# Patient Record
Sex: MALE
[De-identification: names, ages, dates, MRNs, and addresses within clinical notes are randomized per-mention and may not be internally consistent; named-entity substitution may affect disease eponyms.]

---

## 2021-12-05 ENCOUNTER — NURSE TRIAGE (OUTPATIENT)
Dept: OTHER | Facility: CLINIC | Age: 61
End: 2021-12-05

## 2021-12-05 NOTE — TELEPHONE ENCOUNTER
Reason for Disposition   Question about upcoming scheduled test, no triage required and triager able to answer question    Answer Assessment - Initial Assessment Questions  1. REASON FOR CALL or QUESTION: \"What is your reason for calling today? \" or \"How can I best help you? \" or \"What question do you have that I can help answer? \"          Patient has lost pre prep paperwork for stress treadmill test.  Instructed to fast for 12 hours prior to test - no caffeine for 12 hours prior to test.  Inform your provider if you have taken Nitro within the last 24 hours. Bring inhalers to test with you if applicable. Call provider in the morning prior to test to inquire about whether to take heart medications prior to testing. Find paperwork if you can.     Protocols used: INFORMATION ONLY CALL - NO TRIAGE-ADULT-

## 2023-09-06 PROBLEM — F41.9 ANXIETY: Status: ACTIVE | Noted: 2023-09-06

## 2023-09-06 PROBLEM — R73.9 HYPERGLYCEMIA: Status: ACTIVE | Noted: 2023-09-06

## 2023-09-06 PROBLEM — G25.81 RESTLESS LEGS SYNDROME: Status: ACTIVE | Noted: 2023-09-06

## 2023-09-06 PROBLEM — I25.10 CORONARY ARTERIOSCLEROSIS: Status: ACTIVE | Noted: 2023-09-06

## 2023-09-06 PROBLEM — I50.22 CHRONIC SYSTOLIC HEART FAILURE (MULTI): Status: ACTIVE | Noted: 2023-09-06

## 2023-09-06 PROBLEM — I10 HYPERTENSION: Status: ACTIVE | Noted: 2023-09-06

## 2023-09-06 PROBLEM — K92.2 GASTROINTESTINAL HEMORRHAGE: Status: ACTIVE | Noted: 2023-09-06

## 2023-09-06 PROBLEM — E78.5 HYPERLIPIDEMIA: Status: ACTIVE | Noted: 2023-09-06

## 2023-09-06 RX ORDER — LISINOPRIL 10 MG/1
1 TABLET ORAL DAILY
COMMUNITY
End: 2024-01-12 | Stop reason: WASHOUT

## 2023-09-06 RX ORDER — NITROGLYCERIN 0.4 MG/1
TABLET SUBLINGUAL
COMMUNITY
End: 2024-01-12 | Stop reason: SDUPTHER

## 2023-09-06 RX ORDER — PANTOPRAZOLE SODIUM 40 MG/1
1 TABLET, DELAYED RELEASE ORAL DAILY
COMMUNITY
End: 2024-01-12 | Stop reason: WASHOUT

## 2023-09-06 RX ORDER — LISINOPRIL 20 MG/1
1 TABLET ORAL DAILY
COMMUNITY
End: 2024-01-12 | Stop reason: SDUPTHER

## 2023-09-06 RX ORDER — LORAZEPAM 1 MG/1
1 TABLET ORAL 2 TIMES DAILY PRN
COMMUNITY
Start: 2023-04-17 | End: 2023-11-21

## 2023-09-06 RX ORDER — CARVEDILOL 6.25 MG/1
1 TABLET ORAL
COMMUNITY
End: 2024-01-12 | Stop reason: SDUPTHER

## 2023-09-06 RX ORDER — CLOPIDOGREL BISULFATE 75 MG/1
1 TABLET ORAL DAILY
COMMUNITY
End: 2024-01-12 | Stop reason: SDUPTHER

## 2023-09-06 RX ORDER — FENOFIBRATE 145 MG/1
1 TABLET, FILM COATED ORAL
COMMUNITY
End: 2024-01-12 | Stop reason: SDUPTHER

## 2023-09-06 RX ORDER — ATORVASTATIN CALCIUM 40 MG/1
1 TABLET, FILM COATED ORAL DAILY
COMMUNITY
End: 2024-01-12 | Stop reason: WASHOUT

## 2023-09-06 RX ORDER — ASPIRIN 81 MG/1
1 TABLET ORAL DAILY
COMMUNITY
End: 2024-01-12 | Stop reason: WASHOUT

## 2023-11-21 ENCOUNTER — TELEPHONE (OUTPATIENT)
Dept: PRIMARY CARE | Facility: CLINIC | Age: 63
End: 2023-11-21
Payer: COMMERCIAL

## 2023-11-21 DIAGNOSIS — F41.9 ANXIETY: Primary | ICD-10-CM

## 2023-11-21 RX ORDER — LORAZEPAM 1 MG/1
1 TABLET ORAL DAILY PRN
Qty: 30 TABLET | Refills: 0 | Status: SHIPPED | OUTPATIENT
Start: 2023-11-21 | End: 2024-01-09

## 2023-12-16 ENCOUNTER — LAB (OUTPATIENT)
Dept: LAB | Facility: LAB | Age: 63
End: 2023-12-16
Payer: COMMERCIAL

## 2023-12-16 DIAGNOSIS — Z01.89 ENCOUNTER FOR OTHER SPECIFIED SPECIAL EXAMINATIONS: Primary | ICD-10-CM

## 2023-12-16 DIAGNOSIS — E78.2 MIXED HYPERLIPIDEMIA: ICD-10-CM

## 2023-12-16 LAB
ALBUMIN SERPL-MCNC: 4.4 G/DL (ref 3.5–5)
ALP BLD-CCNC: 29 U/L (ref 35–125)
ALT SERPL-CCNC: 22 U/L (ref 5–40)
ANION GAP SERPL CALC-SCNC: 14 MMOL/L
AST SERPL-CCNC: 27 U/L (ref 5–40)
BASOPHILS # BLD AUTO: 0.08 X10*3/UL (ref 0–0.1)
BASOPHILS NFR BLD AUTO: 0.7 %
BILIRUB DIRECT SERPL-MCNC: <0.2 MG/DL (ref 0–0.2)
BILIRUB SERPL-MCNC: 0.2 MG/DL (ref 0.1–1.2)
BUN SERPL-MCNC: 24 MG/DL (ref 8–25)
CALCIUM SERPL-MCNC: 9.6 MG/DL (ref 8.5–10.4)
CHLORIDE SERPL-SCNC: 101 MMOL/L (ref 97–107)
CHOLEST SERPL-MCNC: 153 MG/DL (ref 133–200)
CHOLEST/HDLC SERPL: 4 {RATIO}
CO2 SERPL-SCNC: 21 MMOL/L (ref 24–31)
CREAT SERPL-MCNC: 1.5 MG/DL (ref 0.4–1.6)
EOSINOPHIL # BLD AUTO: 0.29 X10*3/UL (ref 0–0.7)
EOSINOPHIL NFR BLD AUTO: 2.4 %
ERYTHROCYTE [DISTWIDTH] IN BLOOD BY AUTOMATED COUNT: 13.9 % (ref 11.5–14.5)
GFR SERPL CREATININE-BSD FRML MDRD: 51 ML/MIN/1.73M*2
GLUCOSE SERPL-MCNC: 98 MG/DL (ref 65–99)
HCT VFR BLD AUTO: 37.4 % (ref 41–52)
HDLC SERPL-MCNC: 38 MG/DL
HGB BLD-MCNC: 12.1 G/DL (ref 13.5–17.5)
IMM GRANULOCYTES # BLD AUTO: 0.11 X10*3/UL (ref 0–0.7)
IMM GRANULOCYTES NFR BLD AUTO: 0.9 % (ref 0–0.9)
LDLC SERPL CALC-MCNC: 82 MG/DL (ref 65–130)
LYMPHOCYTES # BLD AUTO: 2.87 X10*3/UL (ref 1.2–4.8)
LYMPHOCYTES NFR BLD AUTO: 23.9 %
MCH RBC QN AUTO: 30.6 PG (ref 26–34)
MCHC RBC AUTO-ENTMCNC: 32.4 G/DL (ref 32–36)
MCV RBC AUTO: 94 FL (ref 80–100)
MONOCYTES # BLD AUTO: 0.97 X10*3/UL (ref 0.1–1)
MONOCYTES NFR BLD AUTO: 8.1 %
NEUTROPHILS # BLD AUTO: 7.7 X10*3/UL (ref 1.2–7.7)
NEUTROPHILS NFR BLD AUTO: 64 %
NRBC BLD-RTO: 0 /100 WBCS (ref 0–0)
PLATELET # BLD AUTO: 305 X10*3/UL (ref 150–450)
POTASSIUM SERPL-SCNC: 4.8 MMOL/L (ref 3.4–5.1)
PROT SERPL-MCNC: 7.2 G/DL (ref 5.9–7.9)
RBC # BLD AUTO: 3.96 X10*6/UL (ref 4.5–5.9)
SODIUM SERPL-SCNC: 136 MMOL/L (ref 133–145)
TRIGL SERPL-MCNC: 165 MG/DL (ref 40–150)
WBC # BLD AUTO: 12 X10*3/UL (ref 4.4–11.3)

## 2023-12-16 PROCEDURE — 82248 BILIRUBIN DIRECT: CPT

## 2023-12-16 PROCEDURE — 85025 COMPLETE CBC W/AUTO DIFF WBC: CPT

## 2023-12-16 PROCEDURE — 80053 COMPREHEN METABOLIC PANEL: CPT

## 2023-12-16 PROCEDURE — 36415 COLL VENOUS BLD VENIPUNCTURE: CPT

## 2023-12-16 PROCEDURE — 80061 LIPID PANEL: CPT

## 2024-01-08 DIAGNOSIS — F41.9 ANXIETY: ICD-10-CM

## 2024-01-09 RX ORDER — LORAZEPAM 1 MG/1
1 TABLET ORAL DAILY PRN
Qty: 90 TABLET | Refills: 1 | Status: SHIPPED | OUTPATIENT
Start: 2024-01-09 | End: 2024-01-12 | Stop reason: SDUPTHER

## 2024-01-12 ENCOUNTER — OFFICE VISIT (OUTPATIENT)
Dept: PRIMARY CARE | Facility: CLINIC | Age: 64
End: 2024-01-12
Payer: COMMERCIAL

## 2024-01-12 VITALS
DIASTOLIC BLOOD PRESSURE: 80 MMHG | HEIGHT: 70 IN | SYSTOLIC BLOOD PRESSURE: 132 MMHG | BODY MASS INDEX: 30.64 KG/M2 | WEIGHT: 214 LBS | TEMPERATURE: 98 F | HEART RATE: 87 BPM | OXYGEN SATURATION: 96 %

## 2024-01-12 DIAGNOSIS — I25.10 CORONARY ARTERY DISEASE INVOLVING NATIVE CORONARY ARTERY OF NATIVE HEART WITHOUT ANGINA PECTORIS: ICD-10-CM

## 2024-01-12 DIAGNOSIS — Z23 ENCOUNTER FOR IMMUNIZATION: ICD-10-CM

## 2024-01-12 DIAGNOSIS — E55.9 VITAMIN D DEFICIENCY: ICD-10-CM

## 2024-01-12 DIAGNOSIS — Z12.5 ENCOUNTER FOR PROSTATE CANCER SCREENING: ICD-10-CM

## 2024-01-12 DIAGNOSIS — Z12.12 ENCOUNTER FOR COLORECTAL CANCER SCREENING: ICD-10-CM

## 2024-01-12 DIAGNOSIS — Z01.89 ENCOUNTER FOR ROUTINE LABORATORY TESTING: ICD-10-CM

## 2024-01-12 DIAGNOSIS — Z00.00 ANNUAL PHYSICAL EXAM: Primary | ICD-10-CM

## 2024-01-12 DIAGNOSIS — F41.9 ANXIETY: ICD-10-CM

## 2024-01-12 DIAGNOSIS — N18.31 STAGE 3A CHRONIC KIDNEY DISEASE (MULTI): ICD-10-CM

## 2024-01-12 DIAGNOSIS — Z12.11 ENCOUNTER FOR COLORECTAL CANCER SCREENING: ICD-10-CM

## 2024-01-12 DIAGNOSIS — E03.8 SUBCLINICAL HYPOTHYROIDISM: ICD-10-CM

## 2024-01-12 DIAGNOSIS — E11.69 TYPE 2 DIABETES MELLITUS WITH OTHER SPECIFIED COMPLICATION, WITHOUT LONG-TERM CURRENT USE OF INSULIN (MULTI): ICD-10-CM

## 2024-01-12 DIAGNOSIS — M15.9 PRIMARY OSTEOARTHRITIS INVOLVING MULTIPLE JOINTS: ICD-10-CM

## 2024-01-12 DIAGNOSIS — I10 ESSENTIAL HYPERTENSION: ICD-10-CM

## 2024-01-12 DIAGNOSIS — E78.2 MIXED HYPERLIPIDEMIA: ICD-10-CM

## 2024-01-12 PROBLEM — Z95.5 HISTORY OF CORONARY ARTERY STENT PLACEMENT: Status: ACTIVE | Noted: 2024-01-12

## 2024-01-12 PROBLEM — Z87.19 HISTORY OF GI BLEED: Status: ACTIVE | Noted: 2024-01-12

## 2024-01-12 PROBLEM — N18.9 CHRONIC KIDNEY DISEASE (CKD): Status: ACTIVE | Noted: 2024-01-12

## 2024-01-12 PROBLEM — R73.9 HYPERGLYCEMIA: Status: RESOLVED | Noted: 2023-09-06 | Resolved: 2024-01-12

## 2024-01-12 PROBLEM — M15.0 PRIMARY OSTEOARTHRITIS INVOLVING MULTIPLE JOINTS: Status: ACTIVE | Noted: 2024-01-12

## 2024-01-12 PROBLEM — E11.9 TYPE 2 DIABETES MELLITUS (MULTI): Status: ACTIVE | Noted: 2024-01-12

## 2024-01-12 PROBLEM — K92.2 GASTROINTESTINAL HEMORRHAGE: Status: RESOLVED | Noted: 2023-09-06 | Resolved: 2024-01-12

## 2024-01-12 LAB — POC HEMOGLOBIN A1C: 6.9 % (ref 4.2–6.5)

## 2024-01-12 PROCEDURE — 90677 PCV20 VACCINE IM: CPT | Performed by: STUDENT IN AN ORGANIZED HEALTH CARE EDUCATION/TRAINING PROGRAM

## 2024-01-12 PROCEDURE — 3075F SYST BP GE 130 - 139MM HG: CPT | Performed by: STUDENT IN AN ORGANIZED HEALTH CARE EDUCATION/TRAINING PROGRAM

## 2024-01-12 PROCEDURE — 1126F AMNT PAIN NOTED NONE PRSNT: CPT | Performed by: STUDENT IN AN ORGANIZED HEALTH CARE EDUCATION/TRAINING PROGRAM

## 2024-01-12 PROCEDURE — 1159F MED LIST DOCD IN RCRD: CPT | Performed by: STUDENT IN AN ORGANIZED HEALTH CARE EDUCATION/TRAINING PROGRAM

## 2024-01-12 PROCEDURE — 1036F TOBACCO NON-USER: CPT | Performed by: STUDENT IN AN ORGANIZED HEALTH CARE EDUCATION/TRAINING PROGRAM

## 2024-01-12 PROCEDURE — 99397 PER PM REEVAL EST PAT 65+ YR: CPT | Performed by: STUDENT IN AN ORGANIZED HEALTH CARE EDUCATION/TRAINING PROGRAM

## 2024-01-12 PROCEDURE — 4010F ACE/ARB THERAPY RXD/TAKEN: CPT | Performed by: STUDENT IN AN ORGANIZED HEALTH CARE EDUCATION/TRAINING PROGRAM

## 2024-01-12 PROCEDURE — 3079F DIAST BP 80-89 MM HG: CPT | Performed by: STUDENT IN AN ORGANIZED HEALTH CARE EDUCATION/TRAINING PROGRAM

## 2024-01-12 PROCEDURE — 83036 HEMOGLOBIN GLYCOSYLATED A1C: CPT | Mod: QW | Performed by: STUDENT IN AN ORGANIZED HEALTH CARE EDUCATION/TRAINING PROGRAM

## 2024-01-12 RX ORDER — ROSUVASTATIN CALCIUM 40 MG/1
40 TABLET, COATED ORAL DAILY
Start: 2024-01-12

## 2024-01-12 RX ORDER — ROSUVASTATIN CALCIUM 40 MG/1
40 TABLET, COATED ORAL EVERY 24 HOURS
COMMUNITY
Start: 2023-07-24 | End: 2024-01-12 | Stop reason: SDUPTHER

## 2024-01-12 RX ORDER — MULTIVITAMIN
1 TABLET ORAL DAILY
Start: 2024-01-12

## 2024-01-12 RX ORDER — LISINOPRIL 20 MG/1
20 TABLET ORAL DAILY
Start: 2024-01-12 | End: 2024-03-19 | Stop reason: SDUPTHER

## 2024-01-12 RX ORDER — CARVEDILOL 6.25 MG/1
6.25 TABLET ORAL
Start: 2024-01-12

## 2024-01-12 RX ORDER — METFORMIN HYDROCHLORIDE 500 MG/1
500 TABLET, EXTENDED RELEASE ORAL
Qty: 90 TABLET | Refills: 3 | Status: SHIPPED | OUTPATIENT
Start: 2024-01-12 | End: 2025-01-11

## 2024-01-12 RX ORDER — NITROGLYCERIN 0.4 MG/1
0.4 TABLET SUBLINGUAL EVERY 5 MIN PRN
Start: 2024-01-12

## 2024-01-12 RX ORDER — LORAZEPAM 1 MG/1
1 TABLET ORAL DAILY PRN
Qty: 90 TABLET | Refills: 1
Start: 2024-01-12

## 2024-01-12 RX ORDER — CLOPIDOGREL BISULFATE 75 MG/1
75 TABLET ORAL DAILY
Start: 2024-01-12

## 2024-01-12 RX ORDER — FENOFIBRATE 145 MG/1
145 TABLET, FILM COATED ORAL
Start: 2024-01-12

## 2024-01-12 RX ORDER — ACETAMINOPHEN 500 MG
2000 TABLET ORAL DAILY
Start: 2024-01-12

## 2024-01-12 ASSESSMENT — ENCOUNTER SYMPTOMS
NEUROLOGICAL NEGATIVE: 1
CARDIOVASCULAR NEGATIVE: 1
MUSCULOSKELETAL NEGATIVE: 1
RESPIRATORY NEGATIVE: 1
ALLERGIC/IMMUNOLOGIC NEGATIVE: 1
EYES NEGATIVE: 1
PSYCHIATRIC NEGATIVE: 1
ENDOCRINE NEGATIVE: 1
GASTROINTESTINAL NEGATIVE: 1
CONSTITUTIONAL NEGATIVE: 1
HEMATOLOGIC/LYMPHATIC NEGATIVE: 1

## 2024-01-12 ASSESSMENT — PAIN SCALES - GENERAL: PAINLEVEL: 0-NO PAIN

## 2024-01-12 NOTE — PROGRESS NOTES
[facility]: [facility]  PHYSICAL EXAM      Fredi Neal is a 64 y.o. male that is presenting today for Annual Exam.    Assessment/Plan   Diagnoses and all orders for this visit:  Annual physical exam  Encounter for routine laboratory testing  -     CBC and Auto Differential; Future  -     Basic Metabolic Panel; Future  -     Hepatic Function Panel; Future  -     Lipid Panel; Future  -     Hemoglobin A1C; Future  -     Vitamin D 25-Hydroxy,Total (for eval of Vitamin D levels); Future  -     TSH with reflex to Free T4 if abnormal; Future  -     Prostate Specific Antigen; Future  -     Albumin , Urine Random; Future  Encounter for prostate cancer screening  -     Prostate Specific Antigen; Future  Encounter for colorectal cancer screening  Encounter for immunization  Type 2 diabetes mellitus with other specified complication, without long-term current use of insulin (CMS/[facility])  -     POCT glycosylated hemoglobin (Hb A1C) manually resulted  -     Hemoglobin A1C; Future  -     Albumin , Urine Random; Future  Coronary artery disease involving native coronary artery of native heart without angina pectoris  Comments:  Stable. No changes at this time.  Orders:  -     carvedilol (Coreg) 6.25 mg tablet; Take 1 tablet (6.25 mg) by mouth 2 times a day with meals.  -     clopidogrel (Plavix) 75 mg tablet; Take 1 tablet (75 mg) by mouth once daily.  -     fenofibrate (Tricor) 145 mg tablet; Take 1 tablet (145 mg) by mouth once daily with breakfast.  -     lisinopril 20 mg tablet; Take 1 tablet (20 mg) by mouth once daily.  -     nitroglycerin (Nitrostat) 0.4 mg SL tablet; Place 1 tablet (0.4 mg) under the tongue every 5 minutes if needed for chest pain.  -     rosuvastatin (Crestor) 40 mg tablet; Take 1 tablet (40 mg) by mouth once daily.  Primary osteoarthritis involving multiple joints  Comments:  Stable. No changes at this time.  Stage 3a chronic kidney disease (CMS/HCC)  Comments:  Mild. Does not  require changes in treatment at this time.  Orders:  -     CBC and Auto Differential; Future  -     Basic Metabolic Panel; Future  Subclinical hypothyroidism  Comments:  New diagnosis. Does not require treatment. Check annually.  Orders:  -     TSH with reflex to Free T4 if abnormal; Future  Essential hypertension  Comments:  Stable. No changes at this time.  Orders:  -     carvedilol (Coreg) 6.25 mg tablet; Take 1 tablet (6.25 mg) by mouth 2 times a day with meals.  -     lisinopril 20 mg tablet; Take 1 tablet (20 mg) by mouth once daily.  Mixed hyperlipidemia  Comments:  Stable. No changes at this time.  Orders:  -     fenofibrate (Tricor) 145 mg tablet; Take 1 tablet (145 mg) by mouth once daily with breakfast.  -     rosuvastatin (Crestor) 40 mg tablet; Take 1 tablet (40 mg) by mouth once daily.  -     Lipid Panel; Future  Vitamin D deficiency  Comments:  Start supplementation.  Orders:  -     multivitamin tablet; Take 1 tablet by mouth once daily. One-A-Day Men's 50+ Complete Multivitamin  -     cholecalciferol (Vitamin D3) 50 mcg (2,000 unit) capsule; Take 1 capsule (50 mcg) by mouth once daily.  -     Vitamin D 25-Hydroxy,Total (for eval of Vitamin D levels); Future  Anxiety  Comments:  Stable. No changes at this time.  Orders:  -     LORazepam (Ativan) 1 mg tablet; Take 1 tablet (1 mg) by mouth once daily as needed for anxiety.    Subjective   - The patient otherwise feels well and denies any acute symptoms or concerns at this time.  - The patient denies any changes or progression of their chronic medical problems.  - The patient denies any problems or concerns with their medications.      Review of Systems   Constitutional: Negative.    HENT: Negative.     Eyes: Negative.    Respiratory: Negative.     Cardiovascular: Negative.    Gastrointestinal: Negative.    Endocrine: Negative.    Genitourinary: Negative.    Musculoskeletal: Negative.    Skin: Negative.    Allergic/Immunologic: Negative.    Neurological:  Negative.    Hematological: Negative.    Psychiatric/Behavioral: Negative.     All other systems reviewed and are negative.     Objective   Vitals:    01/12/24 1554   BP: 132/80   Pulse: 87   Temp: 36.7 °C (98 °F)   SpO2: 96%     Body mass index is 30.71 kg/m².  Physical Exam  Vitals and nursing note reviewed.   Constitutional:       General: He is not in acute distress.     Appearance: Normal appearance. He is not ill-appearing.   HENT:      Head: Normocephalic and atraumatic.      Right Ear: Tympanic membrane, ear canal and external ear normal. There is no impacted cerumen.      Left Ear: Tympanic membrane, ear canal and external ear normal. There is no impacted cerumen.      Nose: Nose normal.      Mouth/Throat:      Mouth: Mucous membranes are moist.      Pharynx: Oropharynx is clear. No oropharyngeal exudate or posterior oropharyngeal erythema.   Eyes:      General: No scleral icterus.        Right eye: No discharge.         Left eye: No discharge.      Extraocular Movements: Extraocular movements intact.      Conjunctiva/sclera: Conjunctivae normal.      Pupils: Pupils are equal, round, and reactive to light.   Neck:      Vascular: No carotid bruit.   Cardiovascular:      Rate and Rhythm: Normal rate and regular rhythm.      Pulses: Normal pulses.      Heart sounds: Normal heart sounds. No murmur heard.     No friction rub. No gallop.   Pulmonary:      Effort: Pulmonary effort is normal. No respiratory distress.      Breath sounds: Normal breath sounds.   Abdominal:      General: Abdomen is flat. Bowel sounds are normal.      Palpations: Abdomen is soft.      Tenderness: There is no abdominal tenderness.      Hernia: No hernia is present.   Musculoskeletal:         General: No swelling. Normal range of motion.      Cervical back: Normal range of motion.   Lymphadenopathy:      Cervical: No cervical adenopathy.   Skin:     General: Skin is warm and dry.      Coloration: Skin is not jaundiced.      Findings: No  "rash.   Neurological:      General: No focal deficit present.      Mental Status: He is alert and oriented to person, place, and time. Mental status is at baseline.   Psychiatric:         Mood and Affect: Mood normal.         Behavior: Behavior normal.       Diagnostic Results   Lab Results   Component Value Date    GLUCOSE 98 12/16/2023    CALCIUM 9.6 12/16/2023     12/16/2023    K 4.8 12/16/2023    CO2 21 (L) 12/16/2023     12/16/2023    BUN 24 12/16/2023    CREATININE 1.50 12/16/2023     Lab Results   Component Value Date    ALT 22 12/16/2023    AST 27 12/16/2023    ALKPHOS 29 (L) 12/16/2023    BILITOT 0.2 12/16/2023     Lab Results   Component Value Date    WBC 12.0 (H) 12/16/2023    HGB 12.1 (L) 12/16/2023    HCT 37.4 (L) 12/16/2023    MCV 94 12/16/2023     12/16/2023     Lab Results   Component Value Date    CHOL 153 12/16/2023    CHOL 156 07/22/2023    CHOL 168 06/30/2023     Lab Results   Component Value Date    HDL 38.0 (L) 12/16/2023    HDL 36 (L) 07/22/2023    HDL 38 (L) 06/30/2023     Lab Results   Component Value Date    LDLCALC 82 12/16/2023    LDLCALC 96 07/22/2023    LDLCALC 100 06/30/2023     Lab Results   Component Value Date    TRIG 165 (H) 12/16/2023    TRIG 120 07/22/2023    TRIG 149 06/30/2023     No components found for: \"CHOLHDL\"  Lab Results   Component Value Date    HGBA1C 6.9 (A) 01/12/2024     Other labs not included in the list above were reviewed either before or during this encounter.    History   No past medical history on file.  No past surgical history on file.  Family History   Problem Relation Name Age of Onset    Cancer Mother      Heart attack Father      Heart disease Father      Diabetes Sister      Sleep apnea Brother      No Known Problems Brother      No Known Problems Daughter      No Known Problems Son       Social History     Socioeconomic History    Marital status:      Spouse name: Not on file    Number of children: Not on file    Years of " education: Not on file    Highest education level: Not on file   Occupational History    Not on file   Tobacco Use    Smoking status: Never    Smokeless tobacco: Never   Substance and Sexual Activity    Alcohol use: Yes     Comment: socially    Drug use: Yes     Types: Marijuana    Sexual activity: Not on file   Other Topics Concern    Not on file   Social History Narrative    Not on file     Social Determinants of Health     Financial Resource Strain: Not on file   Food Insecurity: Not on file   Transportation Needs: Not on file   Physical Activity: Not on file   Stress: Not on file   Social Connections: Not on file   Intimate Partner Violence: Not on file   Housing Stability: Not on file     No Known Allergies  Current Outpatient Medications on File Prior to Visit   Medication Sig Dispense Refill    [DISCONTINUED] carvedilol (Coreg) 6.25 mg tablet Take 1 tablet (6.25 mg) by mouth 2 times a day with meals.      [DISCONTINUED] clopidogrel (Plavix) 75 mg tablet Take 1 tablet (75 mg) by mouth once daily.      [DISCONTINUED] fenofibrate (Tricor) 145 mg tablet Take 1 tablet (145 mg) by mouth once daily with breakfast. For 90 days      [DISCONTINUED] lisinopril 20 mg tablet Take 1 tablet (20 mg) by mouth once daily.      [DISCONTINUED] LORazepam (Ativan) 1 mg tablet TAKE ONE TABLET BY MOUTH EVERY DAY AS NEEDED 90 tablet 1    [DISCONTINUED] nitroglycerin (Nitrostat) 0.4 mg SL tablet 1 tablet under the tongue and allow to dissolve as needed Sublingual as directed      [DISCONTINUED] rosuvastatin (Crestor) 40 mg tablet Take 1 tablet (40 mg) by mouth once every 24 hours.      [DISCONTINUED] aspirin 81 mg EC tablet Take 1 tablet (81 mg) by mouth once daily.      [DISCONTINUED] atorvastatin (Lipitor) 40 mg tablet Take 1 tablet (40 mg) by mouth once daily.      [DISCONTINUED] lisinopril 10 mg tablet Take 1 tablet (10 mg) by mouth once daily.      [DISCONTINUED] LORazepam (Ativan) 1 mg tablet TAKE ONE TABLET BY MOUTH DAILY AS  NEEDED 30 tablet 0    [DISCONTINUED] pantoprazole (Protonix) 40 mg EC tablet Take 1 tablet (40 mg) by mouth once daily.       No current facility-administered medications on file prior to visit.     Immunization History   Administered Date(s) Administered    Flu vaccine, quadrivalent, recombinant, preservative free, adult (FLUBLOK) 12/02/2021    Influenza, injectable, quadrivalent 10/01/2020    Pfizer Purple Cap SARS-CoV-2 04/10/2021, 05/01/2021, 12/24/2021    Tdap vaccine, age 7 year and older (BOOSTRIX) 08/23/2017     Patient's medical history was reviewed and updated either before or during this encounter.       Cali Chakraborty MD

## 2024-01-12 NOTE — PROGRESS NOTES
[facility]: [facility]  PHYSICAL EXAM      Fredi Neal is a 64 y.o. male that is presenting today for Annual Exam.    Assessment/Plan   Diagnoses and all orders for this visit:  Annual physical exam  -     Follow Up In Primary Care; Future  Encounter for routine laboratory testing  Comments:  Discussed labwork at length.  Will order labs for the patient's next appointment.  Orders:  -     CBC and Auto Differential; Future  -     Basic Metabolic Panel; Future  -     Hepatic Function Panel; Future  -     Lipid Panel; Future  -     Hemoglobin A1C; Future  -     Vitamin D 25-Hydroxy,Total (for eval of Vitamin D levels); Future  -     TSH with reflex to Free T4 if abnormal; Future  -     Prostate Specific Antigen; Future  -     Albumin , Urine Random; Future  Encounter for prostate cancer screening  -     Prostate Specific Antigen; Future  Encounter for colorectal cancer screening  Comments:  Not due until 2025.  Encounter for immunization  Comments:  Pneumonia (prevnar-20) today.  Patient is otherwise not due for routine immunizations at this time.  Orders:  -     Pneumococcal conjugate vaccine, 20-valent (PREVNAR 20)  Type 2 diabetes mellitus with other specified complication, without long-term current use of insulin (CMS/[facility])  Comments:  Mild worsening. Relatively new diagnosis.  Trial metformin. Counseled patient on potential side effects.  Orders:  -     POCT glycosylated hemoglobin (Hb A1C) manually resulted  -     Hemoglobin A1C; Future  -     Albumin , Urine Random; Future  -     metFORMIN XR (Glucophage-XR) 500 mg 24 hr tablet; Take 1 tablet (500 mg) by mouth once daily in the evening. Take with meals. Do not crush, chew, or split.  Coronary artery disease involving native coronary artery of native heart without angina pectoris  Comments:  Stable. No changes at this time.  Orders:  -     carvedilol (Coreg) 6.25 mg tablet; Take 1 tablet (6.25 mg) by mouth 2 times a day with meals.  -      clopidogrel (Plavix) 75 mg tablet; Take 1 tablet (75 mg) by mouth once daily.  -     fenofibrate (Tricor) 145 mg tablet; Take 1 tablet (145 mg) by mouth once daily with breakfast.  -     lisinopril 20 mg tablet; Take 1 tablet (20 mg) by mouth once daily.  -     nitroglycerin (Nitrostat) 0.4 mg SL tablet; Place 1 tablet (0.4 mg) under the tongue every 5 minutes if needed for chest pain.  -     rosuvastatin (Crestor) 40 mg tablet; Take 1 tablet (40 mg) by mouth once daily.  Primary osteoarthritis involving multiple joints  Comments:  Stable. No changes at this time.  Stage 3a chronic kidney disease (CMS/HCC)  Comments:  Mild. Does not require changes in treatment at this time.  Orders:  -     CBC and Auto Differential; Future  -     Basic Metabolic Panel; Future  Subclinical hypothyroidism  Comments:  New diagnosis. Does not require treatment. Check annually.  Orders:  -     TSH with reflex to Free T4 if abnormal; Future  Essential hypertension  Comments:  Stable. No changes at this time.  Orders:  -     carvedilol (Coreg) 6.25 mg tablet; Take 1 tablet (6.25 mg) by mouth 2 times a day with meals.  -     lisinopril 20 mg tablet; Take 1 tablet (20 mg) by mouth once daily.  Mixed hyperlipidemia  Comments:  Stable. No changes at this time.  Orders:  -     fenofibrate (Tricor) 145 mg tablet; Take 1 tablet (145 mg) by mouth once daily with breakfast.  -     rosuvastatin (Crestor) 40 mg tablet; Take 1 tablet (40 mg) by mouth once daily.  -     Lipid Panel; Future  Vitamin D deficiency  Comments:  Start supplementation.  Orders:  -     multivitamin tablet; Take 1 tablet by mouth once daily. One-A-Day Men's 50+ Complete Multivitamin  -     cholecalciferol (Vitamin D3) 50 mcg (2,000 unit) capsule; Take 1 capsule (50 mcg) by mouth once daily.  -     Vitamin D 25-Hydroxy,Total (for eval of Vitamin D levels); Future  Anxiety  Comments:  Stable. No changes at this time.  Orders:  -     LORazepam (Ativan) 1 mg tablet; Take 1  tablet (1 mg) by mouth once daily as needed for anxiety.    Subjective   - The patient otherwise feels well and denies any acute symptoms or concerns at this time.  - The patient denies any changes or progression of their chronic medical problems.  - The patient denies any problems or concerns with their medications.      Review of Systems   Constitutional: Negative.    HENT: Negative.     Eyes: Negative.    Respiratory: Negative.     Cardiovascular: Negative.    Gastrointestinal: Negative.    Endocrine: Negative.    Genitourinary: Negative.    Musculoskeletal: Negative.    Skin: Negative.    Allergic/Immunologic: Negative.    Neurological: Negative.    Hematological: Negative.    Psychiatric/Behavioral: Negative.     All other systems reviewed and are negative.     Objective   Vitals:    01/12/24 1554   BP: 132/80   Pulse: 87   Temp: 36.7 °C (98 °F)   SpO2: 96%     Body mass index is 30.71 kg/m².  Physical Exam  Vitals and nursing note reviewed.   Constitutional:       General: He is not in acute distress.     Appearance: Normal appearance. He is not ill-appearing.   HENT:      Head: Normocephalic and atraumatic.      Right Ear: Tympanic membrane, ear canal and external ear normal. There is no impacted cerumen.      Left Ear: Tympanic membrane, ear canal and external ear normal. There is no impacted cerumen.      Nose: Nose normal.      Mouth/Throat:      Mouth: Mucous membranes are moist.      Pharynx: Oropharynx is clear. No oropharyngeal exudate or posterior oropharyngeal erythema.   Eyes:      General: No scleral icterus.        Right eye: No discharge.         Left eye: No discharge.      Extraocular Movements: Extraocular movements intact.      Conjunctiva/sclera: Conjunctivae normal.      Pupils: Pupils are equal, round, and reactive to light.   Neck:      Vascular: No carotid bruit.   Cardiovascular:      Rate and Rhythm: Normal rate and regular rhythm.      Pulses: Normal pulses.      Heart sounds: Normal  "heart sounds. No murmur heard.     No friction rub. No gallop.   Pulmonary:      Effort: Pulmonary effort is normal. No respiratory distress.      Breath sounds: Normal breath sounds.   Abdominal:      General: Abdomen is flat. Bowel sounds are normal.      Palpations: Abdomen is soft.      Tenderness: There is no abdominal tenderness.      Hernia: No hernia is present.   Musculoskeletal:         General: No swelling. Normal range of motion.      Cervical back: Normal range of motion.   Lymphadenopathy:      Cervical: No cervical adenopathy.   Skin:     General: Skin is warm and dry.      Coloration: Skin is not jaundiced.      Findings: No rash.   Neurological:      General: No focal deficit present.      Mental Status: He is alert and oriented to person, place, and time. Mental status is at baseline.   Psychiatric:         Mood and Affect: Mood normal.         Behavior: Behavior normal.       Diagnostic Results   Lab Results   Component Value Date    GLUCOSE 98 12/16/2023    CALCIUM 9.6 12/16/2023     12/16/2023    K 4.8 12/16/2023    CO2 21 (L) 12/16/2023     12/16/2023    BUN 24 12/16/2023    CREATININE 1.50 12/16/2023     Lab Results   Component Value Date    ALT 22 12/16/2023    AST 27 12/16/2023    ALKPHOS 29 (L) 12/16/2023    BILITOT 0.2 12/16/2023     Lab Results   Component Value Date    WBC 12.0 (H) 12/16/2023    HGB 12.1 (L) 12/16/2023    HCT 37.4 (L) 12/16/2023    MCV 94 12/16/2023     12/16/2023     Lab Results   Component Value Date    CHOL 153 12/16/2023    CHOL 156 07/22/2023    CHOL 168 06/30/2023     Lab Results   Component Value Date    HDL 38.0 (L) 12/16/2023    HDL 36 (L) 07/22/2023    HDL 38 (L) 06/30/2023     Lab Results   Component Value Date    LDLCALC 82 12/16/2023    LDLCALC 96 07/22/2023    LDLCALC 100 06/30/2023     Lab Results   Component Value Date    TRIG 165 (H) 12/16/2023    TRIG 120 07/22/2023    TRIG 149 06/30/2023     No components found for: \"CHOLHDL\"  Lab " Results   Component Value Date    HGBA1C 6.9 (A) 01/12/2024     Other labs not included in the list above were reviewed either before or during this encounter.    History   No past medical history on file.  No past surgical history on file.  Family History   Problem Relation Name Age of Onset    Cancer Mother      Heart attack Father      Heart disease Father      Diabetes Sister      Sleep apnea Brother      No Known Problems Brother      No Known Problems Daughter      No Known Problems Son       Social History     Socioeconomic History    Marital status:      Spouse name: Not on file    Number of children: Not on file    Years of education: Not on file    Highest education level: Not on file   Occupational History    Not on file   Tobacco Use    Smoking status: Never    Smokeless tobacco: Never   Substance and Sexual Activity    Alcohol use: Yes     Comment: socially    Drug use: Yes     Types: Marijuana    Sexual activity: Not on file   Other Topics Concern    Not on file   Social History Narrative    Not on file     Social Determinants of Health     Financial Resource Strain: Not on file   Food Insecurity: Not on file   Transportation Needs: Not on file   Physical Activity: Not on file   Stress: Not on file   Social Connections: Not on file   Intimate Partner Violence: Not on file   Housing Stability: Not on file     No Known Allergies  Current Outpatient Medications on File Prior to Visit   Medication Sig Dispense Refill    [DISCONTINUED] carvedilol (Coreg) 6.25 mg tablet Take 1 tablet (6.25 mg) by mouth 2 times a day with meals.      [DISCONTINUED] clopidogrel (Plavix) 75 mg tablet Take 1 tablet (75 mg) by mouth once daily.      [DISCONTINUED] fenofibrate (Tricor) 145 mg tablet Take 1 tablet (145 mg) by mouth once daily with breakfast. For 90 days      [DISCONTINUED] lisinopril 20 mg tablet Take 1 tablet (20 mg) by mouth once daily.      [DISCONTINUED] LORazepam (Ativan) 1 mg tablet TAKE ONE TABLET BY  MOUTH EVERY DAY AS NEEDED 90 tablet 1    [DISCONTINUED] nitroglycerin (Nitrostat) 0.4 mg SL tablet 1 tablet under the tongue and allow to dissolve as needed Sublingual as directed      [DISCONTINUED] rosuvastatin (Crestor) 40 mg tablet Take 1 tablet (40 mg) by mouth once every 24 hours.      [DISCONTINUED] aspirin 81 mg EC tablet Take 1 tablet (81 mg) by mouth once daily.      [DISCONTINUED] atorvastatin (Lipitor) 40 mg tablet Take 1 tablet (40 mg) by mouth once daily.      [DISCONTINUED] lisinopril 10 mg tablet Take 1 tablet (10 mg) by mouth once daily.      [DISCONTINUED] LORazepam (Ativan) 1 mg tablet TAKE ONE TABLET BY MOUTH DAILY AS NEEDED 30 tablet 0    [DISCONTINUED] pantoprazole (Protonix) 40 mg EC tablet Take 1 tablet (40 mg) by mouth once daily.       No current facility-administered medications on file prior to visit.     Immunization History   Administered Date(s) Administered    Flu vaccine (IIV4), preservative free *Check age/dose* 10/25/2023    Flu vaccine, quadrivalent, recombinant, preservative free, adult (FLUBLOK) 12/02/2021    Influenza, injectable, quadrivalent 10/01/2020    Pfizer Purple Cap SARS-CoV-2 04/10/2021, 05/01/2021, 12/24/2021    Tdap vaccine, age 7 year and older (BOOSTRIX) 08/23/2017    Zoster vaccine, recombinant, adult (SHINGRIX) 01/04/2023, 05/20/2023     Patient's medical history was reviewed and updated either before or during this encounter.       Cali Chakraborty MD

## 2024-01-12 NOTE — PATIENT INSTRUCTIONS
Diagnoses and all orders for this visit:  Annual physical exam  -     Follow Up In Primary Care; Future  Encounter for routine laboratory testing  Comments:  Discussed labwork at length.  Will order labs for the patient's next appointment.  Orders:  -     CBC and Auto Differential; Future  -     Basic Metabolic Panel; Future  -     Hepatic Function Panel; Future  -     Lipid Panel; Future  -     Hemoglobin A1C; Future  -     Vitamin D 25-Hydroxy,Total (for eval of Vitamin D levels); Future  -     TSH with reflex to Free T4 if abnormal; Future  -     Prostate Specific Antigen; Future  -     Albumin , Urine Random; Future  Encounter for prostate cancer screening  -     Prostate Specific Antigen; Future  Encounter for colorectal cancer screening  Comments:  Not due until 2025.  Encounter for immunization  Comments:  Pneumonia (prevnar-20) today.  Patient is otherwise not due for routine immunizations at this time.  Orders:  -     Pneumococcal conjugate vaccine, 20-valent (PREVNAR 20)  Type 2 diabetes mellitus with other specified complication, without long-term current use of insulin (CMS/Formerly [facility])  Comments:  Mild worsening. Relatively new diagnosis.  Trial metformin. Counseled patient on potential side effects.  Orders:  -     POCT glycosylated hemoglobin (Hb A1C) manually resulted  -     Hemoglobin A1C; Future  -     Albumin , Urine Random; Future  -     metFORMIN XR (Glucophage-XR) 500 mg 24 hr tablet; Take 1 tablet (500 mg) by mouth once daily in the evening. Take with meals. Do not crush, chew, or split.  Coronary artery disease involving native coronary artery of native heart without angina pectoris  Comments:  Stable. No changes at this time.  Orders:  -     carvedilol (Coreg) 6.25 mg tablet; Take 1 tablet (6.25 mg) by mouth 2 times a day with meals.  -     clopidogrel (Plavix) 75 mg tablet; Take 1 tablet (75 mg) by mouth once daily.  -     fenofibrate (Tricor) 145 mg tablet; Take 1 tablet (145 mg) by mouth once  daily with breakfast.  -     lisinopril 20 mg tablet; Take 1 tablet (20 mg) by mouth once daily.  -     nitroglycerin (Nitrostat) 0.4 mg SL tablet; Place 1 tablet (0.4 mg) under the tongue every 5 minutes if needed for chest pain.  -     rosuvastatin (Crestor) 40 mg tablet; Take 1 tablet (40 mg) by mouth once daily.  Primary osteoarthritis involving multiple joints  Comments:  Stable. No changes at this time.  Stage 3a chronic kidney disease (CMS/HCC)  Comments:  Mild. Does not require changes in treatment at this time.  Orders:  -     CBC and Auto Differential; Future  -     Basic Metabolic Panel; Future  Subclinical hypothyroidism  Comments:  New diagnosis. Does not require treatment. Check annually.  Orders:  -     TSH with reflex to Free T4 if abnormal; Future  Essential hypertension  Comments:  Stable. No changes at this time.  Orders:  -     carvedilol (Coreg) 6.25 mg tablet; Take 1 tablet (6.25 mg) by mouth 2 times a day with meals.  -     lisinopril 20 mg tablet; Take 1 tablet (20 mg) by mouth once daily.  Mixed hyperlipidemia  Comments:  Stable. No changes at this time.  Orders:  -     fenofibrate (Tricor) 145 mg tablet; Take 1 tablet (145 mg) by mouth once daily with breakfast.  -     rosuvastatin (Crestor) 40 mg tablet; Take 1 tablet (40 mg) by mouth once daily.  -     Lipid Panel; Future  Vitamin D deficiency  Comments:  Start supplementation.  Orders:  -     multivitamin tablet; Take 1 tablet by mouth once daily. One-A-Day Men's 50+ Complete Multivitamin  -     cholecalciferol (Vitamin D3) 50 mcg (2,000 unit) capsule; Take 1 capsule (50 mcg) by mouth once daily.  -     Vitamin D 25-Hydroxy,Total (for eval of Vitamin D levels); Future  Anxiety  Comments:  Stable. No changes at this time.  Orders:  -     LORazepam (Ativan) 1 mg tablet; Take 1 tablet (1 mg) by mouth once daily as needed for anxiety.

## 2024-01-24 ENCOUNTER — TELEPHONE (OUTPATIENT)
Dept: PRIMARY CARE | Facility: CLINIC | Age: 64
End: 2024-01-24
Payer: COMMERCIAL
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
## 2024-01-25 ENCOUNTER — APPOINTMENT (OUTPATIENT)
Dept: PRIMARY CARE | Facility: CLINIC | Age: 64
End: 2024-01-25
Payer: COMMERCIAL

## 2024-01-30 ENCOUNTER — OFFICE VISIT (OUTPATIENT)
Dept: CARDIOLOGY | Facility: CLINIC | Age: 64
End: 2024-01-30
Payer: COMMERCIAL

## 2024-01-30 VITALS
WEIGHT: 217 LBS | DIASTOLIC BLOOD PRESSURE: 64 MMHG | SYSTOLIC BLOOD PRESSURE: 114 MMHG | BODY MASS INDEX: 31.14 KG/M2 | HEART RATE: 77 BPM | OXYGEN SATURATION: 96 %

## 2024-01-30 DIAGNOSIS — I25.10 CORONARY ARTERY DISEASE INVOLVING NATIVE CORONARY ARTERY OF NATIVE HEART, UNSPECIFIED WHETHER ANGINA PRESENT: Primary | ICD-10-CM

## 2024-01-30 DIAGNOSIS — I10 ESSENTIAL HYPERTENSION: ICD-10-CM

## 2024-01-30 DIAGNOSIS — E78.2 MIXED HYPERLIPIDEMIA: ICD-10-CM

## 2024-01-30 PROCEDURE — 1160F RVW MEDS BY RX/DR IN RCRD: CPT | Performed by: INTERNAL MEDICINE

## 2024-01-30 PROCEDURE — 99213 OFFICE O/P EST LOW 20 MIN: CPT | Performed by: INTERNAL MEDICINE

## 2024-01-30 PROCEDURE — 3078F DIAST BP <80 MM HG: CPT | Performed by: INTERNAL MEDICINE

## 2024-01-30 PROCEDURE — 1036F TOBACCO NON-USER: CPT | Performed by: INTERNAL MEDICINE

## 2024-01-30 PROCEDURE — 1126F AMNT PAIN NOTED NONE PRSNT: CPT | Performed by: INTERNAL MEDICINE

## 2024-01-30 PROCEDURE — 1159F MED LIST DOCD IN RCRD: CPT | Performed by: INTERNAL MEDICINE

## 2024-01-30 PROCEDURE — 3074F SYST BP LT 130 MM HG: CPT | Performed by: INTERNAL MEDICINE

## 2024-01-30 PROCEDURE — 4010F ACE/ARB THERAPY RXD/TAKEN: CPT | Performed by: INTERNAL MEDICINE

## 2024-01-30 ASSESSMENT — PAIN SCALES - GENERAL: PAINLEVEL: 0-NO PAIN

## 2024-01-30 ASSESSMENT — ENCOUNTER SYMPTOMS
DEPRESSION: 0
SHORTNESS OF BREATH: 0
HEMATURIA: 0
PARESTHESIAS: 0
DYSURIA: 0
ABDOMINAL PAIN: 0
COUGH: 0
BLURRED VISION: 0
DYSPNEA ON EXERTION: 0
NUMBNESS: 0
PALPITATIONS: 0
OCCASIONAL FEELINGS OF UNSTEADINESS: 0
LOSS OF SENSATION IN FEET: 0

## 2024-01-30 ASSESSMENT — PATIENT HEALTH QUESTIONNAIRE - PHQ9
1. LITTLE INTEREST OR PLEASURE IN DOING THINGS: NOT AT ALL
2. FEELING DOWN, DEPRESSED OR HOPELESS: NOT AT ALL
SUM OF ALL RESPONSES TO PHQ9 QUESTIONS 1 AND 2: 0

## 2024-01-30 NOTE — ASSESSMENT & PLAN NOTE
Doing very well from a cardiac standpoint with no chest pain or anginal symptoms.  Will continue standard risk factor modification and follow on a regular clinical basis.

## 2024-01-30 NOTE — ASSESSMENT & PLAN NOTE
Reviewed lipid panel:  Tchol 153  HDL 38 LDL 82.  LDL had been on 100 on atorvastatin so down almost 20 points with change to rosuvastatin.  Will stress dietary measures and recheck panel on return.

## 2024-01-30 NOTE — PROGRESS NOTES
Subjective   Fredi Neal is a 64 y.o. male.    Chief Complaint:  6 MONTH FOLLOW UP (Essential hypertension)    HPI  Patient doing well overall.  No chest pain or anginal type symptoms.  No unusual shortness of breath.  Staying relatively active although would like to be more active in the future.    Review of Systems   Constitutional: Negative for malaise/fatigue.   HENT:  Negative for congestion.    Eyes:  Negative for blurred vision.   Cardiovascular:  Negative for chest pain, dyspnea on exertion and palpitations.   Respiratory:  Negative for cough and shortness of breath.    Musculoskeletal:  Negative for joint pain.   Gastrointestinal:  Negative for abdominal pain.   Genitourinary:  Negative for dysuria and hematuria.   Neurological:  Negative for numbness and paresthesias.       Objective   Constitutional:       Appearance: Not in distress.   Eyes:      Conjunctiva/sclera: Conjunctivae normal.   Neck:      Vascular: JVD normal.   Pulmonary:      Breath sounds: Normal breath sounds. No wheezing. No rhonchi. No rales.   Cardiovascular:      Normal rate. Regular rhythm.      Murmurs: There is no murmur.      No gallop.  No click. No rub.   Abdominal:      Palpations: Abdomen is soft.   Neurological:      General: No focal deficit present.      Mental Status: Alert.         Lab Review:   Office Visit on 01/12/2024   Component Date Value    POC HEMOGLOBIN A1c 01/12/2024 6.9 (A)    Lab on 12/16/2023   Component Date Value    Glucose 12/16/2023 98     Sodium 12/16/2023 136     Potassium 12/16/2023 4.8     Chloride 12/16/2023 101     Bicarbonate 12/16/2023 21 (L)     Urea Nitrogen 12/16/2023 24     Creatinine 12/16/2023 1.50     eGFR 12/16/2023 51 (L)     Calcium 12/16/2023 9.6     Anion Gap 12/16/2023 14     AST 12/16/2023 27     ALT 12/16/2023 22     Alkaline Phosphatase 12/16/2023 29 (L)     Bilirubin, Total 12/16/2023 0.2     Bilirubin, Direct 12/16/2023 <0.2     Total Protein 12/16/2023 7.2     Albumin  12/16/2023 4.4     WBC 12/16/2023 12.0 (H)     nRBC 12/16/2023 0.0     RBC 12/16/2023 3.96 (L)     Hemoglobin 12/16/2023 12.1 (L)     Hematocrit 12/16/2023 37.4 (L)     MCV 12/16/2023 94     MCH 12/16/2023 30.6     MCHC 12/16/2023 32.4     RDW 12/16/2023 13.9     Platelets 12/16/2023 305     Neutrophils % 12/16/2023 64.0     Immature Granulocytes %,* 12/16/2023 0.9     Lymphocytes % 12/16/2023 23.9     Monocytes % 12/16/2023 8.1     Eosinophils % 12/16/2023 2.4     Basophils % 12/16/2023 0.7     Neutrophils Absolute 12/16/2023 7.70     Immature Granulocytes Ab* 12/16/2023 0.11     Lymphocytes Absolute 12/16/2023 2.87     Monocytes Absolute 12/16/2023 0.97     Eosinophils Absolute 12/16/2023 0.29     Basophils Absolute 12/16/2023 0.08     Cholesterol 12/16/2023 153     HDL-Cholesterol 12/16/2023 38.0 (L)     Cholesterol/HDL Ratio 12/16/2023 4.0     LDL Calculated 12/16/2023 82     Triglycerides 12/16/2023 165 (H)        Assessment/Plan   The primary encounter diagnosis was Coronary artery disease involving native coronary artery of native heart, unspecified whether angina present. Diagnoses of Essential hypertension and Mixed hyperlipidemia were also pertinent to this visit.    Hyperlipidemia  Reviewed lipid panel:  Tchol 153  HDL 38 LDL 82.  LDL had been on 100 on atorvastatin so down almost 20 points with change to rosuvastatin.  Will stress dietary measures and recheck panel on return.    Coronary artery disease  Doing very well from a cardiac standpoint with no chest pain or anginal symptoms.  Will continue standard risk factor modification and follow on a regular clinical basis.    Essential hypertension  Blood pressure is very well-controlled, no changes need to be made.

## 2024-03-19 DIAGNOSIS — I10 ESSENTIAL HYPERTENSION: ICD-10-CM

## 2024-03-19 DIAGNOSIS — I25.10 CORONARY ARTERY DISEASE INVOLVING NATIVE CORONARY ARTERY OF NATIVE HEART WITHOUT ANGINA PECTORIS: ICD-10-CM

## 2024-03-19 RX ORDER — LISINOPRIL 20 MG/1
20 TABLET ORAL DAILY
Qty: 30 TABLET | Refills: 3 | Status: SHIPPED | OUTPATIENT
Start: 2024-03-19 | End: 2024-07-17

## 2024-07-11 ENCOUNTER — APPOINTMENT (OUTPATIENT)
Dept: PRIMARY CARE | Facility: CLINIC | Age: 64
End: 2024-07-11
Payer: COMMERCIAL

## 2024-07-17 DIAGNOSIS — F41.9 ANXIETY: ICD-10-CM

## 2024-07-18 RX ORDER — LORAZEPAM 1 MG/1
1 TABLET ORAL DAILY PRN
Qty: 15 TABLET | Refills: 0 | Status: SHIPPED | OUTPATIENT
Start: 2024-07-18

## 2024-07-22 ENCOUNTER — OFFICE VISIT (OUTPATIENT)
Dept: CARDIOLOGY | Facility: CLINIC | Age: 64
End: 2024-07-22
Payer: COMMERCIAL

## 2024-07-22 VITALS
BODY MASS INDEX: 31.85 KG/M2 | OXYGEN SATURATION: 97 % | WEIGHT: 222 LBS | DIASTOLIC BLOOD PRESSURE: 70 MMHG | HEART RATE: 82 BPM | SYSTOLIC BLOOD PRESSURE: 132 MMHG

## 2024-07-22 DIAGNOSIS — I10 ESSENTIAL HYPERTENSION: ICD-10-CM

## 2024-07-22 DIAGNOSIS — E78.2 MIXED HYPERLIPIDEMIA: ICD-10-CM

## 2024-07-22 DIAGNOSIS — I25.10 CORONARY ARTERY DISEASE INVOLVING NATIVE CORONARY ARTERY OF NATIVE HEART, UNSPECIFIED WHETHER ANGINA PRESENT: Primary | ICD-10-CM

## 2024-07-22 PROCEDURE — 3075F SYST BP GE 130 - 139MM HG: CPT | Performed by: INTERNAL MEDICINE

## 2024-07-22 PROCEDURE — 99213 OFFICE O/P EST LOW 20 MIN: CPT | Performed by: INTERNAL MEDICINE

## 2024-07-22 PROCEDURE — 4010F ACE/ARB THERAPY RXD/TAKEN: CPT | Performed by: INTERNAL MEDICINE

## 2024-07-22 PROCEDURE — 1159F MED LIST DOCD IN RCRD: CPT | Performed by: INTERNAL MEDICINE

## 2024-07-22 PROCEDURE — 3078F DIAST BP <80 MM HG: CPT | Performed by: INTERNAL MEDICINE

## 2024-07-22 PROCEDURE — 1036F TOBACCO NON-USER: CPT | Performed by: INTERNAL MEDICINE

## 2024-07-22 PROCEDURE — 1126F AMNT PAIN NOTED NONE PRSNT: CPT | Performed by: INTERNAL MEDICINE

## 2024-07-22 ASSESSMENT — ENCOUNTER SYMPTOMS
DEPRESSION: 0
SHORTNESS OF BREATH: 0
PALPITATIONS: 0
BLURRED VISION: 0
HEMATURIA: 0
DYSURIA: 0
PARESTHESIAS: 0
LOSS OF SENSATION IN FEET: 0
NUMBNESS: 0
OCCASIONAL FEELINGS OF UNSTEADINESS: 0
DYSPNEA ON EXERTION: 0
ABDOMINAL PAIN: 0
COUGH: 0

## 2024-07-22 ASSESSMENT — PAIN SCALES - GENERAL: PAINLEVEL: 0-NO PAIN

## 2024-07-22 NOTE — ASSESSMENT & PLAN NOTE
Blood pressure borderline.  Will stress hygienic measures and strive to lose some weight over the next 6 months.  Recheck blood pressure on return visit.  
Lipids in December: Tchol 153  HdL 38 LDL 92.  I see orders for repeat lipid panel from both me and Dr. Chakraborty but no result.  Patient forgot to have his laboratory studies done.  He will have a lipid panel drawn in the coming weeks and we will review with him on return visit.  
Stable with no anginal type symptoms.  Will continue standard risk factor modification and will follow on a clinical basis.  
Yes

## 2024-07-22 NOTE — PROGRESS NOTES
Subjective   Fredi Neal is a 64 y.o. male.    Chief Complaint:  6 month follow up    HPI  Physically patient feels fine with no chest pain or anginal type symptoms.  Some emotional stress at home as her wife has been undergoing some health issues.    Review of Systems   Constitutional: Negative for malaise/fatigue.   HENT:  Negative for congestion.    Eyes:  Negative for blurred vision.   Cardiovascular:  Negative for chest pain, dyspnea on exertion and palpitations.   Respiratory:  Negative for cough and shortness of breath.    Musculoskeletal:  Negative for joint pain.   Gastrointestinal:  Negative for abdominal pain.   Genitourinary:  Negative for dysuria and hematuria.   Neurological:  Negative for numbness and paresthesias.       Objective   Constitutional:       Appearance: Not in distress.   Eyes:      Conjunctiva/sclera: Conjunctivae normal.   Neck:      Vascular: JVD normal.   Pulmonary:      Breath sounds: Normal breath sounds. No wheezing. No rhonchi. No rales.   Cardiovascular:      Normal rate. Regular rhythm.      Murmurs: There is no murmur.      No gallop.  No click. No rub.   Abdominal:      Palpations: Abdomen is soft.   Neurological:      General: No focal deficit present.      Mental Status: Alert.         Lab Review:       Assessment/Plan   The primary encounter diagnosis was Coronary artery disease involving native coronary artery of native heart, unspecified whether angina present. Diagnoses of Essential hypertension and Mixed hyperlipidemia were also pertinent to this visit.    Hyperlipidemia  Lipids in December: Tchol 153  HdL 38 LDL 92.  I see orders for repeat lipid panel from both me and Dr. Chakraborty but no result.  Patient forgot to have his laboratory studies done.  He will have a lipid panel drawn in the coming weeks and we will review with him on return visit.    Coronary artery disease  Stable with no anginal type symptoms.  Will continue standard risk factor  modification and will follow on a clinical basis.    Essential hypertension  Blood pressure borderline.  Will stress hygienic measures and strive to lose some weight over the next 6 months.  Recheck blood pressure on return visit.

## 2024-07-25 DIAGNOSIS — I10 ESSENTIAL HYPERTENSION: ICD-10-CM

## 2024-07-25 DIAGNOSIS — I25.10 CORONARY ARTERY DISEASE INVOLVING NATIVE CORONARY ARTERY OF NATIVE HEART WITHOUT ANGINA PECTORIS: ICD-10-CM

## 2024-07-25 RX ORDER — LISINOPRIL 20 MG/1
20 TABLET ORAL DAILY
Qty: 30 TABLET | Refills: 0 | Status: SHIPPED | OUTPATIENT
Start: 2024-07-25

## 2024-07-26 ENCOUNTER — APPOINTMENT (OUTPATIENT)
Dept: PRIMARY CARE | Facility: CLINIC | Age: 64
End: 2024-07-26
Payer: COMMERCIAL

## 2024-07-26 ENCOUNTER — LAB (OUTPATIENT)
Dept: LAB | Facility: LAB | Age: 64
End: 2024-07-26
Payer: COMMERCIAL

## 2024-07-26 DIAGNOSIS — E55.9 VITAMIN D DEFICIENCY: ICD-10-CM

## 2024-07-26 DIAGNOSIS — E03.8 SUBCLINICAL HYPOTHYROIDISM: ICD-10-CM

## 2024-07-26 DIAGNOSIS — N18.31 STAGE 3A CHRONIC KIDNEY DISEASE (MULTI): ICD-10-CM

## 2024-07-26 DIAGNOSIS — E78.2 MIXED HYPERLIPIDEMIA: ICD-10-CM

## 2024-07-26 DIAGNOSIS — Z12.5 ENCOUNTER FOR PROSTATE CANCER SCREENING: ICD-10-CM

## 2024-07-26 DIAGNOSIS — Z01.89 ENCOUNTER FOR ROUTINE LABORATORY TESTING: ICD-10-CM

## 2024-07-26 DIAGNOSIS — E11.69 TYPE 2 DIABETES MELLITUS WITH OTHER SPECIFIED COMPLICATION, WITHOUT LONG-TERM CURRENT USE OF INSULIN (MULTI): ICD-10-CM

## 2024-07-26 LAB
25(OH)D3 SERPL-MCNC: 23 NG/ML (ref 31–100)
ALBUMIN SERPL-MCNC: 4.1 G/DL (ref 3.5–5)
ALP BLD-CCNC: 33 U/L (ref 35–125)
ALT SERPL-CCNC: 17 U/L (ref 5–40)
ANION GAP SERPL CALC-SCNC: 13 MMOL/L
AST SERPL-CCNC: 22 U/L (ref 5–40)
BASOPHILS # BLD AUTO: 0.05 X10*3/UL (ref 0–0.1)
BASOPHILS NFR BLD AUTO: 0.4 %
BILIRUB DIRECT SERPL-MCNC: <0.2 MG/DL (ref 0–0.2)
BILIRUB SERPL-MCNC: <0.2 MG/DL (ref 0.1–1.2)
BUN SERPL-MCNC: 27 MG/DL (ref 8–25)
CALCIUM SERPL-MCNC: 8.9 MG/DL (ref 8.5–10.4)
CHLORIDE SERPL-SCNC: 101 MMOL/L (ref 97–107)
CHOLEST SERPL-MCNC: 136 MG/DL (ref 133–200)
CHOLEST/HDLC SERPL: 3.9 {RATIO}
CO2 SERPL-SCNC: 21 MMOL/L (ref 24–31)
CREAT SERPL-MCNC: 1.3 MG/DL (ref 0.4–1.6)
CREAT UR-MCNC: 123.8 MG/DL
EGFRCR SERPLBLD CKD-EPI 2021: 61 ML/MIN/1.73M*2
EOSINOPHIL # BLD AUTO: 0.22 X10*3/UL (ref 0–0.7)
EOSINOPHIL NFR BLD AUTO: 2 %
ERYTHROCYTE [DISTWIDTH] IN BLOOD BY AUTOMATED COUNT: 14.1 % (ref 11.5–14.5)
EST. AVERAGE GLUCOSE BLD GHB EST-MCNC: 146 MG/DL
GLUCOSE SERPL-MCNC: 145 MG/DL (ref 65–99)
HBA1C MFR BLD: 6.7 %
HCT VFR BLD AUTO: 38.7 % (ref 41–52)
HDLC SERPL-MCNC: 35 MG/DL
HGB BLD-MCNC: 12.5 G/DL (ref 13.5–17.5)
IMM GRANULOCYTES # BLD AUTO: 0.1 X10*3/UL (ref 0–0.7)
IMM GRANULOCYTES NFR BLD AUTO: 0.9 % (ref 0–0.9)
LDLC SERPL CALC-MCNC: 68 MG/DL (ref 65–130)
LYMPHOCYTES # BLD AUTO: 2.19 X10*3/UL (ref 1.2–4.8)
LYMPHOCYTES NFR BLD AUTO: 19.5 %
MCH RBC QN AUTO: 30.3 PG (ref 26–34)
MCHC RBC AUTO-ENTMCNC: 32.3 G/DL (ref 32–36)
MCV RBC AUTO: 94 FL (ref 80–100)
MICROALBUMIN UR-MCNC: 28 MG/L (ref 0–23)
MICROALBUMIN/CREAT UR: 22.6 UG/MG CREAT
MONOCYTES # BLD AUTO: 1.05 X10*3/UL (ref 0.1–1)
MONOCYTES NFR BLD AUTO: 9.4 %
NEUTROPHILS # BLD AUTO: 7.61 X10*3/UL (ref 1.2–7.7)
NEUTROPHILS NFR BLD AUTO: 67.8 %
NRBC BLD-RTO: 0 /100 WBCS (ref 0–0)
PLATELET # BLD AUTO: 286 X10*3/UL (ref 150–450)
POTASSIUM SERPL-SCNC: 4.7 MMOL/L (ref 3.4–5.1)
PROT SERPL-MCNC: 6.8 G/DL (ref 5.9–7.9)
PSA SERPL-MCNC: 0.6 NG/ML
RBC # BLD AUTO: 4.13 X10*6/UL (ref 4.5–5.9)
SODIUM SERPL-SCNC: 135 MMOL/L (ref 133–145)
T4 FREE SERPL-MCNC: 1.2 NG/DL (ref 0.9–1.7)
TRIGL SERPL-MCNC: 164 MG/DL (ref 40–150)
TSH SERPL DL<=0.05 MIU/L-ACNC: 10.3 MIU/L (ref 0.27–4.2)
WBC # BLD AUTO: 11.2 X10*3/UL (ref 4.4–11.3)

## 2024-07-26 PROCEDURE — 83036 HEMOGLOBIN GLYCOSYLATED A1C: CPT

## 2024-07-26 PROCEDURE — 84153 ASSAY OF PSA TOTAL: CPT

## 2024-07-26 PROCEDURE — 82043 UR ALBUMIN QUANTITATIVE: CPT

## 2024-07-26 PROCEDURE — 85025 COMPLETE CBC W/AUTO DIFF WBC: CPT

## 2024-07-26 PROCEDURE — 84443 ASSAY THYROID STIM HORMONE: CPT

## 2024-07-26 PROCEDURE — 80053 COMPREHEN METABOLIC PANEL: CPT

## 2024-07-26 PROCEDURE — 80061 LIPID PANEL: CPT

## 2024-07-26 PROCEDURE — 82570 ASSAY OF URINE CREATININE: CPT

## 2024-07-26 PROCEDURE — 82306 VITAMIN D 25 HYDROXY: CPT

## 2024-07-26 PROCEDURE — 36415 COLL VENOUS BLD VENIPUNCTURE: CPT

## 2024-07-26 PROCEDURE — 84439 ASSAY OF FREE THYROXINE: CPT

## 2024-07-26 PROCEDURE — 82248 BILIRUBIN DIRECT: CPT

## 2024-07-30 ENCOUNTER — OFFICE VISIT (OUTPATIENT)
Dept: PRIMARY CARE | Facility: CLINIC | Age: 64
End: 2024-07-30
Payer: COMMERCIAL

## 2024-07-30 ENCOUNTER — LAB (OUTPATIENT)
Dept: LAB | Facility: LAB | Age: 64
End: 2024-07-30
Payer: COMMERCIAL

## 2024-07-30 VITALS
BODY MASS INDEX: 31.21 KG/M2 | SYSTOLIC BLOOD PRESSURE: 130 MMHG | TEMPERATURE: 98.7 F | HEART RATE: 86 BPM | OXYGEN SATURATION: 95 % | DIASTOLIC BLOOD PRESSURE: 80 MMHG | WEIGHT: 218 LBS | HEIGHT: 70 IN

## 2024-07-30 DIAGNOSIS — Z00.00 ANNUAL PHYSICAL EXAM: ICD-10-CM

## 2024-07-30 DIAGNOSIS — I25.10 CORONARY ARTERY DISEASE INVOLVING NATIVE CORONARY ARTERY OF NATIVE HEART, UNSPECIFIED WHETHER ANGINA PRESENT: ICD-10-CM

## 2024-07-30 DIAGNOSIS — M15.9 PRIMARY OSTEOARTHRITIS INVOLVING MULTIPLE JOINTS: ICD-10-CM

## 2024-07-30 DIAGNOSIS — E78.2 MIXED HYPERLIPIDEMIA: ICD-10-CM

## 2024-07-30 DIAGNOSIS — G25.81 RLS (RESTLESS LEGS SYNDROME): ICD-10-CM

## 2024-07-30 DIAGNOSIS — I10 PRIMARY HYPERTENSION: ICD-10-CM

## 2024-07-30 DIAGNOSIS — E03.8 SUBCLINICAL HYPOTHYROIDISM: ICD-10-CM

## 2024-07-30 DIAGNOSIS — N18.31 STAGE 3A CHRONIC KIDNEY DISEASE (MULTI): ICD-10-CM

## 2024-07-30 DIAGNOSIS — Z79.899 POLYPHARMACY: ICD-10-CM

## 2024-07-30 DIAGNOSIS — F41.9 ANXIETY: ICD-10-CM

## 2024-07-30 DIAGNOSIS — Z01.89 ENCOUNTER FOR ROUTINE LABORATORY TESTING: ICD-10-CM

## 2024-07-30 DIAGNOSIS — E11.69 TYPE 2 DIABETES MELLITUS WITH OTHER SPECIFIED COMPLICATION, WITHOUT LONG-TERM CURRENT USE OF INSULIN (MULTI): Primary | ICD-10-CM

## 2024-07-30 DIAGNOSIS — E55.9 VITAMIN D DEFICIENCY: ICD-10-CM

## 2024-07-30 PROBLEM — M19.90 OA (OSTEOARTHRITIS): Status: ACTIVE | Noted: 2024-01-12

## 2024-07-30 PROCEDURE — 82570 ASSAY OF URINE CREATININE: CPT

## 2024-07-30 PROCEDURE — 3044F HG A1C LEVEL LT 7.0%: CPT | Performed by: STUDENT IN AN ORGANIZED HEALTH CARE EDUCATION/TRAINING PROGRAM

## 2024-07-30 PROCEDURE — 80361 OPIATES 1 OR MORE: CPT

## 2024-07-30 PROCEDURE — 3008F BODY MASS INDEX DOCD: CPT | Performed by: STUDENT IN AN ORGANIZED HEALTH CARE EDUCATION/TRAINING PROGRAM

## 2024-07-30 PROCEDURE — 80368 SEDATIVE HYPNOTICS: CPT

## 2024-07-30 PROCEDURE — 1126F AMNT PAIN NOTED NONE PRSNT: CPT | Performed by: STUDENT IN AN ORGANIZED HEALTH CARE EDUCATION/TRAINING PROGRAM

## 2024-07-30 PROCEDURE — 80346 BENZODIAZEPINES1-12: CPT

## 2024-07-30 PROCEDURE — 80365 DRUG SCREENING OXYCODONE: CPT

## 2024-07-30 PROCEDURE — 3061F NEG MICROALBUMINURIA REV: CPT | Performed by: STUDENT IN AN ORGANIZED HEALTH CARE EDUCATION/TRAINING PROGRAM

## 2024-07-30 PROCEDURE — 80349 CANNABINOIDS NATURAL: CPT

## 2024-07-30 PROCEDURE — 1159F MED LIST DOCD IN RCRD: CPT | Performed by: STUDENT IN AN ORGANIZED HEALTH CARE EDUCATION/TRAINING PROGRAM

## 2024-07-30 PROCEDURE — 1036F TOBACCO NON-USER: CPT | Performed by: STUDENT IN AN ORGANIZED HEALTH CARE EDUCATION/TRAINING PROGRAM

## 2024-07-30 PROCEDURE — 80358 DRUG SCREENING METHADONE: CPT

## 2024-07-30 PROCEDURE — 4010F ACE/ARB THERAPY RXD/TAKEN: CPT | Performed by: STUDENT IN AN ORGANIZED HEALTH CARE EDUCATION/TRAINING PROGRAM

## 2024-07-30 PROCEDURE — 80307 DRUG TEST PRSMV CHEM ANLYZR: CPT

## 2024-07-30 PROCEDURE — 1160F RVW MEDS BY RX/DR IN RCRD: CPT | Performed by: STUDENT IN AN ORGANIZED HEALTH CARE EDUCATION/TRAINING PROGRAM

## 2024-07-30 PROCEDURE — 3048F LDL-C <100 MG/DL: CPT | Performed by: STUDENT IN AN ORGANIZED HEALTH CARE EDUCATION/TRAINING PROGRAM

## 2024-07-30 PROCEDURE — 99214 OFFICE O/P EST MOD 30 MIN: CPT | Performed by: STUDENT IN AN ORGANIZED HEALTH CARE EDUCATION/TRAINING PROGRAM

## 2024-07-30 PROCEDURE — G2211 COMPLEX E/M VISIT ADD ON: HCPCS | Performed by: STUDENT IN AN ORGANIZED HEALTH CARE EDUCATION/TRAINING PROGRAM

## 2024-07-30 PROCEDURE — 80354 DRUG SCREENING FENTANYL: CPT

## 2024-07-30 PROCEDURE — 3079F DIAST BP 80-89 MM HG: CPT | Performed by: STUDENT IN AN ORGANIZED HEALTH CARE EDUCATION/TRAINING PROGRAM

## 2024-07-30 PROCEDURE — 80373 DRUG SCREENING TRAMADOL: CPT

## 2024-07-30 PROCEDURE — 3075F SYST BP GE 130 - 139MM HG: CPT | Performed by: STUDENT IN AN ORGANIZED HEALTH CARE EDUCATION/TRAINING PROGRAM

## 2024-07-30 RX ORDER — SERTRALINE HYDROCHLORIDE 25 MG/1
25 TABLET, FILM COATED ORAL DAILY
Qty: 30 TABLET | Refills: 1 | Status: SHIPPED | OUTPATIENT
Start: 2024-07-30 | End: 2024-09-28

## 2024-07-30 ASSESSMENT — ENCOUNTER SYMPTOMS
RESPIRATORY NEGATIVE: 1
CARDIOVASCULAR NEGATIVE: 1
CONSTITUTIONAL NEGATIVE: 1
LOSS OF SENSATION IN FEET: 0
DEPRESSION: 0
OCCASIONAL FEELINGS OF UNSTEADINESS: 0
GASTROINTESTINAL NEGATIVE: 1

## 2024-07-30 ASSESSMENT — PAIN SCALES - GENERAL: PAINLEVEL: 0-NO PAIN

## 2024-07-30 ASSESSMENT — PATIENT HEALTH QUESTIONNAIRE - PHQ9
SUM OF ALL RESPONSES TO PHQ9 QUESTIONS 1 AND 2: 0
1. LITTLE INTEREST OR PLEASURE IN DOING THINGS: NOT AT ALL
2. FEELING DOWN, DEPRESSED OR HOPELESS: NOT AT ALL

## 2024-07-30 NOTE — PATIENT INSTRUCTIONS
- Significant medication and problem list reconciliation done today.  - Discussed with the patient the hospital policy regarding all controlled substances.  - Discussed with the patient that they would need to sign a controlled substance agreement (CSA), which would include them agreeing to more frequent visits in the office as well as a minimum of completion of one urine drug screen (UDS) per year. Discussed with the patient that the hospital policy (as well as the state law) may require that I refer them to specialists.  - Based on this discussion, the patient is agreeable to signing the CSA. Discussed with the patient that they would be provided a paper copy and that they could also find this on their MyChart. Patient voiced understanding and agreement.  - The patient has not successfully completed their urine drug screen (UDS) this year. Discussed with them that I will not be able to prescribe their controlled medication(s) until this is completed and appropriate. Patient voiced understanding and agreement.  - I do believe that the medication(s) are medically necessary. Patient has tried multiple alternatives to controlled substances in the past with limited success.  - The patient's symptoms are well-controlled on current therapy. No need for changes at this time.  - I have considered the risks of abuse, dependence, addiction, and/or diversion and have discussed these with the patient during our appointment.  - PDMP reviewed and appropriate.  - Patient had labwork done for this appointment. Discussed today.  - Vitamin D deficiency. Improved, but not normal. Increase OTC supplementation.  - Sugars stable. Do not need to make changes at this time.  - Thyroid studies remain abnormal. Discussed with the patient that, for TSH > 10, it would not be wrong to increase the dosing of the levothyroxine. At the moment, the patient is noting worsening anxiety / stress, I don't think that it is the right decision to increase  the levothyroxine at this time.  - Remainder of labwork looked great.  - Patient will not require labwork for their next appointment.  - Blood pressure at goal today.  - Encouraged continued diet, exercise, and lifestyle modification.

## 2024-07-30 NOTE — PROGRESS NOTES
[facility]  PROGRESS NOTE      Fredi Neal is a 64 y.o. male that is presenting today for Annual Exam.    Assessment/Plan   - Significant medication and problem list reconciliation done today.  - Discussed with the patient the hospital policy regarding all controlled substances.  - Discussed with the patient that they would need to sign a controlled substance agreement (CSA), which would include them agreeing to more frequent visits in the office as well as a minimum of completion of one urine drug screen (UDS) per year. Discussed with the patient that the hospital policy (as well as the state law) may require that I refer them to specialists.  - Based on this discussion, the patient is agreeable to signing the CSA. Discussed with the patient that they would be provided a paper copy and that they could also find this on their MyChart. Patient voiced understanding and agreement.  - The patient has not successfully completed their urine drug screen (UDS) this year. Discussed with them that I will not be able to prescribe their controlled medication(s) until this is completed and appropriate. Patient voiced understanding and agreement.  - I do believe that the medication(s) are medically necessary. Patient has tried multiple alternatives to controlled substances in the past with limited success.  - The patient's symptoms are well-controlled on current therapy. No need for changes at this time.  - I have considered the risks of abuse, dependence, addiction, and/or diversion and have discussed these with the patient during our appointment.  - PDMP reviewed and appropriate.  - Patient had labwork done for this appointment. Discussed today.  - Vitamin D deficiency. Improved, but not normal. Increase OTC supplementation.  - Sugars stable. Do not need to make changes at this time.  - Thyroid studies remain abnormal. Discussed with the patient that, for TSH > 10, it would not be wrong  to increase the dosing of the levothyroxine. At the moment, the patient is noting worsening anxiety / stress, I don't think that it is the right decision to increase the levothyroxine at this time.  - Remainder of labwork looked great.  - Patient will not require labwork for their next appointment.  - Blood pressure at goal today.  - Encouraged continued diet, exercise, and lifestyle modification.    Current Outpatient Medications   Medication Instructions    carvedilol (COREG) 6.25 mg, oral, 2 times daily (morning and late afternoon)    cholecalciferol (VITAMIN D3) 50 mcg, oral, Daily    clopidogrel (PLAVIX) 75 mg, oral, Daily    fenofibrate (TRICOR) 145 mg, oral, Daily with breakfast    lisinopril 20 mg, oral, Daily    LORazepam (ATIVAN) 1 mg, oral, Daily PRN    metFORMIN XR (GLUCOPHAGE-XR) 500 mg, oral, Daily with evening meal, Do not crush, chew, or split.    multivitamin tablet 1 tablet, oral, Daily, One-A-Day Men's 50+ Complete Multivitamin    nitroglycerin (NITROSTAT) 0.4 mg, sublingual, Every 5 min PRN    rosuvastatin (CRESTOR) 40 mg, oral, Daily    sertraline (ZOLOFT) 25 mg, oral, Daily     Diagnoses and all orders for this visit:  Type 2 diabetes mellitus with other specified complication, without long-term current use of insulin (Multi)  Coronary artery disease involving native coronary artery of native heart, unspecified whether angina present  Primary osteoarthritis involving multiple joints  Stage 3a chronic kidney disease (Multi)  RLS (restless legs syndrome)  Subclinical hypothyroidism  Mixed hyperlipidemia  Primary hypertension  Vitamin D deficiency  Polypharmacy  -     Follow Up In Primary Care; Future  -     Opiate/Opioid/Benzo Prescription Compliance; Future  Anxiety  -     sertraline (Zoloft) 25 mg tablet; Take 1 tablet (25 mg) by mouth once daily.  -     Follow Up In Primary Care; Future  Encounter for routine laboratory testing  Annual physical exam  -     Follow Up In Primary  Care    Subjective   - The patient otherwise feels well and denies any acute symptoms or concerns at this time.  - The patient denies any changes or progression of their chronic medical problems.  - The patient denies any problems or concerns with their medications.      Review of Systems   Constitutional: Negative.    Respiratory: Negative.     Cardiovascular: Negative.    Gastrointestinal: Negative.    All other systems reviewed and are negative.     Objective   Vitals:    07/30/24 1533   BP: 130/80   Pulse: 86   Temp: 37.1 °C (98.7 °F)   SpO2: 95%      Body mass index is 31.28 kg/m².  Physical Exam  Vitals and nursing note reviewed.   Constitutional:       General: He is not in acute distress.  Neck:      Vascular: No carotid bruit.   Cardiovascular:      Rate and Rhythm: Normal rate and regular rhythm.      Heart sounds: Normal heart sounds.   Pulmonary:      Effort: Pulmonary effort is normal.      Breath sounds: Normal breath sounds.   Musculoskeletal:         General: No swelling.   Neurological:      Mental Status: He is alert. Mental status is at baseline.   Psychiatric:         Mood and Affect: Mood normal.       Diagnostic Results   Lab Results   Component Value Date    GLUCOSE 145 (H) 07/26/2024    CALCIUM 8.9 07/26/2024     07/26/2024    K 4.7 07/26/2024    CO2 21 (L) 07/26/2024     07/26/2024    BUN 27 (H) 07/26/2024    CREATININE 1.30 07/26/2024     Lab Results   Component Value Date    ALT 17 07/26/2024    AST 22 07/26/2024    ALKPHOS 33 (L) 07/26/2024    BILITOT <0.2 07/26/2024     Lab Results   Component Value Date    WBC 11.2 07/26/2024    HGB 12.5 (L) 07/26/2024    HCT 38.7 (L) 07/26/2024    MCV 94 07/26/2024     07/26/2024     Lab Results   Component Value Date    CHOL 136 07/26/2024    CHOL 153 12/16/2023    CHOL 156 07/22/2023     Lab Results   Component Value Date    HDL 35.0 (L) 07/26/2024    HDL 38.0 (L) 12/16/2023    HDL 36 (L) 07/22/2023     Lab Results   Component  "Value Date    LDLCALC 68 07/26/2024    LDLCALC 82 12/16/2023    LDLCALC 96 07/22/2023     Lab Results   Component Value Date    TRIG 164 (H) 07/26/2024    TRIG 165 (H) 12/16/2023    TRIG 120 07/22/2023     No components found for: \"CHOLHDL\"  Lab Results   Component Value Date    HGBA1C 6.7 (H) 07/26/2024     Other labs not included in the list above were reviewed either before or during this encounter.    History    Past Medical History:   Diagnosis Date    Anxiety     ASHD (arteriosclerotic heart disease)     CAD (coronary artery disease)     DJD (degenerative joint disease)     Hyperglycemia     Hyperlipidemia     Hypertension      Past Surgical History:   Procedure Laterality Date    CARDIAC CATHETERIZATION      04/2014     Family History   Problem Relation Name Age of Onset    Cancer Mother      Heart attack Father      Heart disease Father      Diabetes Sister      Sleep apnea Brother      No Known Problems Brother      No Known Problems Daughter      No Known Problems Son       Social History     Socioeconomic History    Marital status:      Spouse name: Not on file    Number of children: Not on file    Years of education: Not on file    Highest education level: Not on file   Occupational History    Not on file   Tobacco Use    Smoking status: Never    Smokeless tobacco: Never   Substance and Sexual Activity    Alcohol use: Yes     Comment: socially    Drug use: Yes     Types: Marijuana    Sexual activity: Defer   Other Topics Concern    Not on file   Social History Narrative    Not on file     Social Determinants of Health     Financial Resource Strain: Not on file   Food Insecurity: Not on file   Transportation Needs: Not on file   Physical Activity: Not on file   Stress: Not on file   Social Connections: Not on file   Intimate Partner Violence: Not on file   Housing Stability: Not on file     No Known Allergies  Current Outpatient Medications on File Prior to Visit   Medication Sig Dispense Refill    " carvedilol (Coreg) 6.25 mg tablet Take 1 tablet (6.25 mg) by mouth 2 times a day with meals.      cholecalciferol (Vitamin D3) 50 mcg (2,000 unit) capsule Take 1 capsule (50 mcg) by mouth once daily.      clopidogrel (Plavix) 75 mg tablet Take 1 tablet (75 mg) by mouth once daily.      fenofibrate (Tricor) 145 mg tablet Take 1 tablet (145 mg) by mouth once daily with breakfast.      lisinopril 20 mg tablet TAKE ONE TABLET BY MOUTH ONCE A DAY 30 tablet 0    LORazepam (Ativan) 1 mg tablet Take 1 tablet (1 mg) by mouth once daily as needed for anxiety. 15 tablet 0    metFORMIN XR (Glucophage-XR) 500 mg 24 hr tablet Take 1 tablet (500 mg) by mouth once daily in the evening. Take with meals. Do not crush, chew, or split. 90 tablet 3    multivitamin tablet Take 1 tablet by mouth once daily. One-A-Day Men's 50+ Complete Multivitamin      nitroglycerin (Nitrostat) 0.4 mg SL tablet Place 1 tablet (0.4 mg) under the tongue every 5 minutes if needed for chest pain.      rosuvastatin (Crestor) 40 mg tablet Take 1 tablet (40 mg) by mouth once daily.      [DISCONTINUED] lisinopril 20 mg tablet Take 1 tablet (20 mg) by mouth once daily. 30 tablet 3     No current facility-administered medications on file prior to visit.     Immunization History   Administered Date(s) Administered    Flu vaccine (IIV4), preservative free *Check age/dose* 10/25/2023    Flu vaccine, quadrivalent, recombinant, preservative free, adult (FLUBLOK) 12/02/2021    Influenza, injectable, quadrivalent 10/01/2020    Pfizer Purple Cap SARS-CoV-2 04/10/2021, 05/01/2021, 12/24/2021    Pneumococcal conjugate vaccine, 20-valent (PREVNAR 20) 01/12/2024    Tdap vaccine, age 7 year and older (BOOSTRIX, ADACEL) 08/23/2017    Zoster vaccine, recombinant, adult (SHINGRIX) 01/04/2023, 05/20/2023     Patient's medical history was reviewed and updated either before or during this encounter.       Cali Chakraborty MD

## 2024-07-31 LAB
AMPHETAMINES UR QL SCN: ABNORMAL
BARBITURATES UR QL SCN: ABNORMAL
BZE UR QL SCN: ABNORMAL
CANNABINOIDS UR QL SCN: ABNORMAL
CREAT UR-MCNC: 51.2 MG/DL (ref 20–370)
PCP UR QL SCN: ABNORMAL

## 2024-08-02 LAB
1OH-MIDAZOLAM UR CFM-MCNC: <25 NG/ML
6MAM UR CFM-MCNC: <25 NG/ML
7AMINOCLONAZEPAM UR CFM-MCNC: <25 NG/ML
A-OH ALPRAZ UR CFM-MCNC: <25 NG/ML
ALPRAZ UR CFM-MCNC: <25 NG/ML
CHLORDIAZEP UR CFM-MCNC: <25 NG/ML
CLONAZEPAM UR CFM-MCNC: <25 NG/ML
CODEINE UR CFM-MCNC: <50 NG/ML
DIAZEPAM UR CFM-MCNC: <25 NG/ML
EDDP UR CFM-MCNC: <25 NG/ML
FENTANYL UR CFM-MCNC: <2.5 NG/ML
HYDROCODONE CTO UR CFM-MCNC: <25 NG/ML
HYDROMORPHONE UR CFM-MCNC: <25 NG/ML
LORAZEPAM UR CFM-MCNC: 154 NG/ML
METHADONE UR CFM-MCNC: <25 NG/ML
MIDAZOLAM UR CFM-MCNC: <25 NG/ML
MORPHINE UR CFM-MCNC: <50 NG/ML
NORDIAZEPAM UR CFM-MCNC: <25 NG/ML
NORFENTANYL UR CFM-MCNC: <2.5 NG/ML
NORHYDROCODONE UR CFM-MCNC: <25 NG/ML
NOROXYCODONE UR CFM-MCNC: <25 NG/ML
NORTRAMADOL UR-MCNC: <50 NG/ML
OXAZEPAM UR CFM-MCNC: <25 NG/ML
OXYCODONE UR CFM-MCNC: <25 NG/ML
OXYMORPHONE UR CFM-MCNC: <25 NG/ML
TEMAZEPAM UR CFM-MCNC: <25 NG/ML
TRAMADOL UR CFM-MCNC: <50 NG/ML
ZOLPIDEM UR CFM-MCNC: <25 NG/ML
ZOLPIDEM UR-MCNC: <25 NG/ML

## 2024-08-03 DIAGNOSIS — F41.9 ANXIETY: ICD-10-CM

## 2024-08-04 LAB — CARBOXYTHC UR-MCNC: 48 NG/ML

## 2024-08-06 DIAGNOSIS — F41.9 ANXIETY: ICD-10-CM

## 2024-08-06 RX ORDER — LORAZEPAM 1 MG/1
1 TABLET ORAL DAILY PRN
Qty: 30 TABLET | Refills: 0 | Status: SHIPPED | OUTPATIENT
Start: 2024-08-06 | End: 2024-08-07

## 2024-08-07 RX ORDER — LORAZEPAM 1 MG/1
1 TABLET ORAL DAILY PRN
Qty: 30 TABLET | Refills: 0 | Status: SHIPPED | OUTPATIENT
Start: 2024-08-07

## 2024-08-09 DIAGNOSIS — I25.10 CORONARY ARTERY DISEASE INVOLVING NATIVE CORONARY ARTERY OF NATIVE HEART WITHOUT ANGINA PECTORIS: ICD-10-CM

## 2024-08-09 DIAGNOSIS — E78.2 MIXED HYPERLIPIDEMIA: ICD-10-CM

## 2024-08-09 RX ORDER — ROSUVASTATIN CALCIUM 40 MG/1
40 TABLET, COATED ORAL DAILY
Qty: 90 TABLET | Refills: 0 | Status: SHIPPED | OUTPATIENT
Start: 2024-08-09

## 2024-08-09 RX ORDER — CLOPIDOGREL BISULFATE 75 MG/1
75 TABLET ORAL DAILY
Qty: 90 TABLET | Refills: 0 | Status: SHIPPED | OUTPATIENT
Start: 2024-08-09

## 2024-08-30 ENCOUNTER — TELEPHONE (OUTPATIENT)
Dept: PRIMARY CARE | Facility: CLINIC | Age: 64
End: 2024-08-30
Payer: COMMERCIAL

## 2024-08-30 ENCOUNTER — APPOINTMENT (OUTPATIENT)
Dept: PRIMARY CARE | Facility: CLINIC | Age: 64
End: 2024-08-30
Payer: COMMERCIAL

## 2024-08-30 DIAGNOSIS — I25.10 CORONARY ARTERY DISEASE INVOLVING NATIVE CORONARY ARTERY OF NATIVE HEART WITHOUT ANGINA PECTORIS: ICD-10-CM

## 2024-08-30 DIAGNOSIS — I10 ESSENTIAL HYPERTENSION: ICD-10-CM

## 2024-09-03 RX ORDER — LISINOPRIL 20 MG/1
20 TABLET ORAL DAILY
Qty: 30 TABLET | Refills: 3 | Status: SHIPPED | OUTPATIENT
Start: 2024-09-03 | End: 2024-09-05 | Stop reason: SDUPTHER

## 2024-09-04 DIAGNOSIS — F41.9 ANXIETY: ICD-10-CM

## 2024-09-04 RX ORDER — LORAZEPAM 1 MG/1
1 TABLET ORAL DAILY PRN
Qty: 30 TABLET | Refills: 0 | Status: SHIPPED | OUTPATIENT
Start: 2024-09-04

## 2024-09-05 ENCOUNTER — TELEMEDICINE (OUTPATIENT)
Dept: PRIMARY CARE | Facility: CLINIC | Age: 64
End: 2024-09-05
Payer: COMMERCIAL

## 2024-09-05 DIAGNOSIS — F41.9 ANXIETY: Primary | ICD-10-CM

## 2024-09-05 DIAGNOSIS — Z79.899 POLYPHARMACY: ICD-10-CM

## 2024-09-05 PROCEDURE — 3048F LDL-C <100 MG/DL: CPT | Performed by: STUDENT IN AN ORGANIZED HEALTH CARE EDUCATION/TRAINING PROGRAM

## 2024-09-05 PROCEDURE — 3060F POS MICROALBUMINURIA REV: CPT | Performed by: STUDENT IN AN ORGANIZED HEALTH CARE EDUCATION/TRAINING PROGRAM

## 2024-09-05 PROCEDURE — 4010F ACE/ARB THERAPY RXD/TAKEN: CPT | Performed by: STUDENT IN AN ORGANIZED HEALTH CARE EDUCATION/TRAINING PROGRAM

## 2024-09-05 PROCEDURE — 1159F MED LIST DOCD IN RCRD: CPT | Performed by: STUDENT IN AN ORGANIZED HEALTH CARE EDUCATION/TRAINING PROGRAM

## 2024-09-05 PROCEDURE — 1160F RVW MEDS BY RX/DR IN RCRD: CPT | Performed by: STUDENT IN AN ORGANIZED HEALTH CARE EDUCATION/TRAINING PROGRAM

## 2024-09-05 PROCEDURE — 3044F HG A1C LEVEL LT 7.0%: CPT | Performed by: STUDENT IN AN ORGANIZED HEALTH CARE EDUCATION/TRAINING PROGRAM

## 2024-09-05 PROCEDURE — 99441 PR PHYS/QHP TELEPHONE EVALUATION 5-10 MIN: CPT | Performed by: STUDENT IN AN ORGANIZED HEALTH CARE EDUCATION/TRAINING PROGRAM

## 2024-09-05 RX ORDER — LISINOPRIL 20 MG/1
20 TABLET ORAL DAILY
Qty: 30 TABLET | Refills: 0 | Status: SHIPPED | OUTPATIENT
Start: 2024-09-05

## 2024-09-05 RX ORDER — SERTRALINE HYDROCHLORIDE 50 MG/1
50 TABLET, FILM COATED ORAL DAILY
Qty: 30 TABLET | Refills: 1 | Status: SHIPPED | OUTPATIENT
Start: 2024-09-05 | End: 2024-11-04

## 2024-09-05 ASSESSMENT — ENCOUNTER SYMPTOMS
GASTROINTESTINAL NEGATIVE: 1
CARDIOVASCULAR NEGATIVE: 1
CONSTITUTIONAL NEGATIVE: 1
RESPIRATORY NEGATIVE: 1

## 2024-09-05 NOTE — PROGRESS NOTES
[facility]: [facility]  TELEHEALTH ENCOUNTER      Fredi Neal is a 64 y.o. male that is presenting today for Follow-up.  This is a telehealth encounter with audio technology. Patient has consented to this type of visit. Duration of encounter: 5 minutes.    Assessment/Plan   Diagnoses and all orders for this visit:  Anxiety  -     sertraline (Zoloft) 50 mg tablet; Take 1 tablet (50 mg) by mouth once daily.  Polypharmacy  -     Follow Up In Primary Care    Patient denying side effects. Notes minimal improvement in anxiety. Increase the dosage. Reassess in 4 weeks.    Current Outpatient Medications   Medication Instructions    carvedilol (COREG) 6.25 mg, oral, 2 times daily (morning and late afternoon)    cholecalciferol (VITAMIN D3) 50 mcg, oral, Daily    clopidogrel (PLAVIX) 75 mg, oral, Daily    fenofibrate (TRICOR) 145 mg, oral, Daily with breakfast    lisinopril 20 mg, oral, Daily    LORazepam (ATIVAN) 1 mg, oral, Daily PRN    metFORMIN XR (GLUCOPHAGE-XR) 500 mg, oral, Daily with evening meal, Do not crush, chew, or split.    multivitamin tablet 1 tablet, oral, Daily, One-A-Day Men's 50+ Complete Multivitamin    nitroglycerin (NITROSTAT) 0.4 mg, sublingual, Every 5 min PRN    rosuvastatin (CRESTOR) 40 mg, oral, Daily    sertraline (ZOLOFT) 50 mg, oral, Daily     Subjective   - The patient otherwise feels well and denies any acute symptoms or concerns at this time.  - The patient denies any changes or progression of their chronic medical problems.  - The patient denies any problems or concerns with their medications.      Review of Systems   Constitutional: Negative.    Respiratory: Negative.     Cardiovascular: Negative.    Gastrointestinal: Negative.    All other systems reviewed and are negative.     Objective   There were no vitals filed for this visit.  There is no height or weight on file to calculate BMI.  Physical Exam  Vitals (Patient-reported vitals.) reviewed.  "  Constitutional:       Comments: This is a virtual / telehealth encounter; unable to perform physical exam.       Diagnostic Results   Lab Results   Component Value Date    GLUCOSE 145 (H) 07/26/2024    CALCIUM 8.9 07/26/2024     07/26/2024    K 4.7 07/26/2024    CO2 21 (L) 07/26/2024     07/26/2024    BUN 27 (H) 07/26/2024    CREATININE 1.30 07/26/2024     Lab Results   Component Value Date    ALT 17 07/26/2024    AST 22 07/26/2024    ALKPHOS 33 (L) 07/26/2024    BILITOT <0.2 07/26/2024     Lab Results   Component Value Date    WBC 11.2 07/26/2024    HGB 12.5 (L) 07/26/2024    HCT 38.7 (L) 07/26/2024    MCV 94 07/26/2024     07/26/2024     Lab Results   Component Value Date    CHOL 136 07/26/2024    CHOL 153 12/16/2023    CHOL 156 07/22/2023     Lab Results   Component Value Date    HDL 35.0 (L) 07/26/2024    HDL 38.0 (L) 12/16/2023    HDL 36 (L) 07/22/2023     Lab Results   Component Value Date    LDLCALC 68 07/26/2024    LDLCALC 82 12/16/2023    LDLCALC 96 07/22/2023     Lab Results   Component Value Date    TRIG 164 (H) 07/26/2024    TRIG 165 (H) 12/16/2023    TRIG 120 07/22/2023     No components found for: \"CHOLHDL\"  Lab Results   Component Value Date    HGBA1C 6.7 (H) 07/26/2024     Other labs not included in the list above were reviewed either before or during this encounter.    History   Past Medical History:   Diagnosis Date    Anxiety     ASHD (arteriosclerotic heart disease)     CAD (coronary artery disease)     DJD (degenerative joint disease)     Hyperglycemia     Hyperlipidemia     Hypertension      Past Surgical History:   Procedure Laterality Date    CARDIAC CATHETERIZATION      04/2014     Family History   Problem Relation Name Age of Onset    Cancer Mother      Heart attack Father      Heart disease Father      Diabetes Sister      Sleep apnea Brother      No Known Problems Brother      No Known Problems Daughter      No Known Problems Son       Social History     Socioeconomic " History    Marital status:      Spouse name: Not on file    Number of children: Not on file    Years of education: Not on file    Highest education level: Not on file   Occupational History    Not on file   Tobacco Use    Smoking status: Never    Smokeless tobacco: Never   Substance and Sexual Activity    Alcohol use: Yes     Comment: socially    Drug use: Yes     Types: Marijuana    Sexual activity: Defer   Other Topics Concern    Not on file   Social History Narrative    Not on file     Social Determinants of Health     Financial Resource Strain: Not on file   Food Insecurity: Not on file   Transportation Needs: Not on file   Physical Activity: Not on file   Stress: Not on file   Social Connections: Not on file   Intimate Partner Violence: Not on file   Housing Stability: Not on file     No Known Allergies  Current Outpatient Medications on File Prior to Visit   Medication Sig Dispense Refill    carvedilol (Coreg) 6.25 mg tablet Take 1 tablet (6.25 mg) by mouth 2 times a day with meals.      cholecalciferol (Vitamin D3) 50 mcg (2,000 unit) capsule Take 1 capsule (50 mcg) by mouth once daily.      clopidogrel (Plavix) 75 mg tablet TAKE ONE TABLET BY MOUTH EVERY DAY 90 tablet 0    fenofibrate (Tricor) 145 mg tablet Take 1 tablet (145 mg) by mouth once daily with breakfast.      lisinopril 20 mg tablet TAKE ONE TABLET BY MOUTH EVERY DAY 30 tablet 0    LORazepam (Ativan) 1 mg tablet TAKE ONE TABLET BY MOUTH EVERY DAY AS NEEDED FOR ANXIETY 30 tablet 0    metFORMIN XR (Glucophage-XR) 500 mg 24 hr tablet Take 1 tablet (500 mg) by mouth once daily in the evening. Take with meals. Do not crush, chew, or split. 90 tablet 3    multivitamin tablet Take 1 tablet by mouth once daily. One-A-Day Men's 50+ Complete Multivitamin      nitroglycerin (Nitrostat) 0.4 mg SL tablet Place 1 tablet (0.4 mg) under the tongue every 5 minutes if needed for chest pain.      rosuvastatin (Crestor) 40 mg tablet TAKE ONE TABLET BY MOUTH  EVERY DAY 90 tablet 0    [DISCONTINUED] sertraline (Zoloft) 25 mg tablet Take 1 tablet (25 mg) by mouth once daily. 30 tablet 1    [DISCONTINUED] lisinopril 20 mg tablet TAKE ONE TABLET BY MOUTH ONCE A DAY 30 tablet 0    [DISCONTINUED] lisinopril 20 mg tablet Take 1 tablet (20 mg) by mouth once daily. (Patient not taking: Reported on 9/5/2024) 30 tablet 3    [DISCONTINUED] LORazepam (Ativan) 1 mg tablet TAKE ONE TABLET BY MOUTH ONCE DAILY AS NEEDED FOR ANXIETY 30 tablet 0     No current facility-administered medications on file prior to visit.     Immunization History   Administered Date(s) Administered    Flu vaccine (IIV4), preservative free *Check age/dose* 10/25/2023    Flu vaccine, quadrivalent, recombinant, preservative free, adult (FLUBLOK) 12/02/2021    Influenza, injectable, quadrivalent 10/01/2020    Pfizer Purple Cap SARS-CoV-2 04/10/2021, 05/01/2021, 12/24/2021    Pneumococcal conjugate vaccine, 20-valent (PREVNAR 20) 01/12/2024    Tdap vaccine, age 7 year and older (BOOSTRIX, ADACEL) 08/23/2017    Zoster vaccine, recombinant, adult (SHINGRIX) 01/04/2023, 05/20/2023     Patient's medical history was reviewed and updated either before or during this encounter.       Cali Chakraborty MD

## 2024-09-06 DIAGNOSIS — I25.10 CORONARY ARTERY DISEASE INVOLVING NATIVE CORONARY ARTERY OF NATIVE HEART WITHOUT ANGINA PECTORIS: ICD-10-CM

## 2024-09-06 DIAGNOSIS — I10 ESSENTIAL HYPERTENSION: ICD-10-CM

## 2024-09-06 RX ORDER — CARVEDILOL 6.25 MG/1
6.25 TABLET ORAL
Qty: 180 TABLET | Refills: 3 | Status: SHIPPED | OUTPATIENT
Start: 2024-09-06

## 2024-10-03 ENCOUNTER — TELEMEDICINE (OUTPATIENT)
Dept: PRIMARY CARE | Facility: CLINIC | Age: 64
End: 2024-10-03
Payer: COMMERCIAL

## 2024-10-03 DIAGNOSIS — F41.9 ANXIETY: Primary | ICD-10-CM

## 2024-10-03 PROCEDURE — 1036F TOBACCO NON-USER: CPT | Performed by: STUDENT IN AN ORGANIZED HEALTH CARE EDUCATION/TRAINING PROGRAM

## 2024-10-03 PROCEDURE — 3060F POS MICROALBUMINURIA REV: CPT | Performed by: STUDENT IN AN ORGANIZED HEALTH CARE EDUCATION/TRAINING PROGRAM

## 2024-10-03 PROCEDURE — 3048F LDL-C <100 MG/DL: CPT | Performed by: STUDENT IN AN ORGANIZED HEALTH CARE EDUCATION/TRAINING PROGRAM

## 2024-10-03 PROCEDURE — 4010F ACE/ARB THERAPY RXD/TAKEN: CPT | Performed by: STUDENT IN AN ORGANIZED HEALTH CARE EDUCATION/TRAINING PROGRAM

## 2024-10-03 PROCEDURE — 1160F RVW MEDS BY RX/DR IN RCRD: CPT | Performed by: STUDENT IN AN ORGANIZED HEALTH CARE EDUCATION/TRAINING PROGRAM

## 2024-10-03 PROCEDURE — 1159F MED LIST DOCD IN RCRD: CPT | Performed by: STUDENT IN AN ORGANIZED HEALTH CARE EDUCATION/TRAINING PROGRAM

## 2024-10-03 PROCEDURE — 3044F HG A1C LEVEL LT 7.0%: CPT | Performed by: STUDENT IN AN ORGANIZED HEALTH CARE EDUCATION/TRAINING PROGRAM

## 2024-10-03 PROCEDURE — 99441 PR PHYS/QHP TELEPHONE EVALUATION 5-10 MIN: CPT | Performed by: STUDENT IN AN ORGANIZED HEALTH CARE EDUCATION/TRAINING PROGRAM

## 2024-10-03 RX ORDER — SERTRALINE HYDROCHLORIDE 100 MG/1
100 TABLET, FILM COATED ORAL DAILY
Qty: 30 TABLET | Refills: 1 | Status: SHIPPED | OUTPATIENT
Start: 2024-10-03 | End: 2024-12-02

## 2024-10-03 ASSESSMENT — ENCOUNTER SYMPTOMS
CONSTITUTIONAL NEGATIVE: 1
CARDIOVASCULAR NEGATIVE: 1
GASTROINTESTINAL NEGATIVE: 1
RESPIRATORY NEGATIVE: 1

## 2024-10-03 ASSESSMENT — PATIENT HEALTH QUESTIONNAIRE - PHQ9
2. FEELING DOWN, DEPRESSED OR HOPELESS: NOT AT ALL
SUM OF ALL RESPONSES TO PHQ9 QUESTIONS 1 AND 2: 0
1. LITTLE INTEREST OR PLEASURE IN DOING THINGS: NOT AT ALL

## 2024-10-03 NOTE — PROGRESS NOTES
[facility]: [facility]  TELEHEALTH ENCOUNTER      Fredi Neal is a 64 y.o. male that is presenting today for Follow-up.  This is a telehealth encounter with audio technology. Patient has consented to this type of visit. Duration of encounter: 5 minutes.    Assessment/Plan   Diagnoses and all orders for this visit:  Anxiety  -     sertraline (Zoloft) 100 mg tablet; Take 1 tablet (100 mg) by mouth once daily.    We increased the dosing of the sertraline at the last telemedicine appointment. Since that time, the patient is noting a moderate improvement in his symptoms. Will attempt to increase again and reassess in four weeks.    Current Outpatient Medications   Medication Instructions    carvedilol (COREG) 6.25 mg, oral, 2 times daily after meals    cholecalciferol (VITAMIN D3) 50 mcg, oral, Daily    clopidogrel (PLAVIX) 75 mg, oral, Daily    fenofibrate (TRICOR) 145 mg, oral, Daily with breakfast    lisinopril 20 mg, oral, Daily    LORazepam (ATIVAN) 1 mg, oral, Daily PRN    metFORMIN XR (GLUCOPHAGE-XR) 500 mg, oral, Daily with evening meal, Do not crush, chew, or split.    multivitamin tablet 1 tablet, oral, Daily, One-A-Day Men's 50+ Complete Multivitamin    nitroglycerin (NITROSTAT) 0.4 mg, sublingual, Every 5 min PRN    rosuvastatin (CRESTOR) 40 mg, oral, Daily    sertraline (ZOLOFT) 100 mg, oral, Daily     Subjective   - The patient otherwise feels well and denies any acute symptoms or concerns at this time.  - The patient denies any changes or progression of their chronic medical problems.  - The patient denies any problems or concerns with their medications.      Review of Systems   Constitutional: Negative.    Respiratory: Negative.     Cardiovascular: Negative.    Gastrointestinal: Negative.    All other systems reviewed and are negative.     Objective   There were no vitals filed for this visit.  There is no height or weight on file to calculate BMI.  Physical Exam  Vitals  "(Patient-reported vitals.) reviewed.   Constitutional:       Comments: This is a virtual / telehealth encounter; unable to perform physical exam.       Diagnostic Results   Lab Results   Component Value Date    GLUCOSE 145 (H) 07/26/2024    CALCIUM 8.9 07/26/2024     07/26/2024    K 4.7 07/26/2024    CO2 21 (L) 07/26/2024     07/26/2024    BUN 27 (H) 07/26/2024    CREATININE 1.30 07/26/2024     Lab Results   Component Value Date    ALT 17 07/26/2024    AST 22 07/26/2024    ALKPHOS 33 (L) 07/26/2024    BILITOT <0.2 07/26/2024     Lab Results   Component Value Date    WBC 11.2 07/26/2024    HGB 12.5 (L) 07/26/2024    HCT 38.7 (L) 07/26/2024    MCV 94 07/26/2024     07/26/2024     Lab Results   Component Value Date    CHOL 136 07/26/2024    CHOL 153 12/16/2023    CHOL 156 07/22/2023     Lab Results   Component Value Date    HDL 35.0 (L) 07/26/2024    HDL 38.0 (L) 12/16/2023    HDL 36 (L) 07/22/2023     Lab Results   Component Value Date    LDLCALC 68 07/26/2024    LDLCALC 82 12/16/2023    LDLCALC 96 07/22/2023     Lab Results   Component Value Date    TRIG 164 (H) 07/26/2024    TRIG 165 (H) 12/16/2023    TRIG 120 07/22/2023     No components found for: \"CHOLHDL\"  Lab Results   Component Value Date    HGBA1C 6.7 (H) 07/26/2024     Other labs not included in the list above were reviewed either before or during this encounter.    History   Past Medical History:   Diagnosis Date    Anxiety     ASHD (arteriosclerotic heart disease)     CAD (coronary artery disease)     DJD (degenerative joint disease)     Hyperglycemia     Hyperlipidemia     Hypertension      Past Surgical History:   Procedure Laterality Date    CARDIAC CATHETERIZATION      04/2014     Family History   Problem Relation Name Age of Onset    Cancer Mother      Heart attack Father      Heart disease Father      Diabetes Sister      Sleep apnea Brother      No Known Problems Brother      No Known Problems Daughter      No Known Problems Son   "     Social History     Socioeconomic History    Marital status:      Spouse name: Not on file    Number of children: Not on file    Years of education: Not on file    Highest education level: Not on file   Occupational History    Not on file   Tobacco Use    Smoking status: Never    Smokeless tobacco: Never   Substance and Sexual Activity    Alcohol use: Yes     Comment: socially    Drug use: Yes     Types: Marijuana    Sexual activity: Defer   Other Topics Concern    Not on file   Social History Narrative    Not on file     Social Determinants of Health     Financial Resource Strain: Not on file   Food Insecurity: Not on file   Transportation Needs: Not on file   Physical Activity: Not on file   Stress: Not on file   Social Connections: Not on file   Intimate Partner Violence: Not on file   Housing Stability: Not on file     No Known Allergies  Current Outpatient Medications on File Prior to Visit   Medication Sig Dispense Refill    carvedilol (Coreg) 6.25 mg tablet TAKE ONE TABLET BY MOUTH TWO TIMES A DAY WITH FOOD 180 tablet 3    cholecalciferol (Vitamin D3) 50 mcg (2,000 unit) capsule Take 1 capsule (50 mcg) by mouth once daily.      clopidogrel (Plavix) 75 mg tablet TAKE ONE TABLET BY MOUTH EVERY DAY 90 tablet 0    fenofibrate (Tricor) 145 mg tablet Take 1 tablet (145 mg) by mouth once daily with breakfast.      lisinopril 20 mg tablet TAKE ONE TABLET BY MOUTH EVERY DAY 30 tablet 0    LORazepam (Ativan) 1 mg tablet TAKE ONE TABLET BY MOUTH EVERY DAY AS NEEDED FOR ANXIETY 30 tablet 0    metFORMIN XR (Glucophage-XR) 500 mg 24 hr tablet Take 1 tablet (500 mg) by mouth once daily in the evening. Take with meals. Do not crush, chew, or split. 90 tablet 3    multivitamin tablet Take 1 tablet by mouth once daily. One-A-Day Men's 50+ Complete Multivitamin      nitroglycerin (Nitrostat) 0.4 mg SL tablet Place 1 tablet (0.4 mg) under the tongue every 5 minutes if needed for chest pain.      rosuvastatin (Crestor)  40 mg tablet TAKE ONE TABLET BY MOUTH EVERY DAY 90 tablet 0    [DISCONTINUED] sertraline (Zoloft) 50 mg tablet Take 1 tablet (50 mg) by mouth once daily. 30 tablet 1     No current facility-administered medications on file prior to visit.     Immunization History   Administered Date(s) Administered    Flu vaccine (IIV4), preservative free *Check age/dose* 10/25/2023    Flu vaccine, quadrivalent, recombinant, preservative free, adult (FLUBLOK) 12/02/2021    Influenza, injectable, quadrivalent 10/01/2020    Pfizer Purple Cap SARS-CoV-2 04/10/2021, 05/01/2021, 12/24/2021    Pneumococcal conjugate vaccine, 20-valent (PREVNAR 20) 01/12/2024    Tdap vaccine, age 7 year and older (BOOSTRIX, ADACEL) 08/23/2017    Zoster vaccine, recombinant, adult (SHINGRIX) 01/04/2023, 05/20/2023     Patient's medical history was reviewed and updated either before or during this encounter.       Cali Chakraborty MD

## 2024-10-04 DIAGNOSIS — I25.10 CORONARY ARTERY DISEASE INVOLVING NATIVE CORONARY ARTERY OF NATIVE HEART WITHOUT ANGINA PECTORIS: ICD-10-CM

## 2024-10-04 DIAGNOSIS — E78.2 MIXED HYPERLIPIDEMIA: ICD-10-CM

## 2024-10-04 RX ORDER — FENOFIBRATE 145 MG/1
145 TABLET, FILM COATED ORAL DAILY
Qty: 90 TABLET | Refills: 3 | Status: SHIPPED | OUTPATIENT
Start: 2024-10-04

## 2024-10-06 DIAGNOSIS — I25.10 CORONARY ARTERY DISEASE INVOLVING NATIVE CORONARY ARTERY OF NATIVE HEART WITHOUT ANGINA PECTORIS: ICD-10-CM

## 2024-10-06 DIAGNOSIS — I10 ESSENTIAL HYPERTENSION: ICD-10-CM

## 2024-10-07 RX ORDER — LISINOPRIL 20 MG/1
20 TABLET ORAL DAILY
Qty: 90 TABLET | Refills: 3 | Status: SHIPPED | OUTPATIENT
Start: 2024-10-07

## 2024-11-05 DIAGNOSIS — F41.9 ANXIETY: ICD-10-CM

## 2024-11-06 RX ORDER — LORAZEPAM 1 MG/1
1 TABLET ORAL DAILY PRN
Qty: 30 TABLET | Refills: 0 | Status: SHIPPED | OUTPATIENT
Start: 2024-11-06

## 2024-11-11 ENCOUNTER — TELEMEDICINE (OUTPATIENT)
Dept: PRIMARY CARE | Facility: CLINIC | Age: 64
End: 2024-11-11
Payer: COMMERCIAL

## 2024-11-11 DIAGNOSIS — F41.9 ANXIETY: ICD-10-CM

## 2024-11-11 PROCEDURE — 3048F LDL-C <100 MG/DL: CPT | Performed by: STUDENT IN AN ORGANIZED HEALTH CARE EDUCATION/TRAINING PROGRAM

## 2024-11-11 PROCEDURE — 4010F ACE/ARB THERAPY RXD/TAKEN: CPT | Performed by: STUDENT IN AN ORGANIZED HEALTH CARE EDUCATION/TRAINING PROGRAM

## 2024-11-11 PROCEDURE — 3044F HG A1C LEVEL LT 7.0%: CPT | Performed by: STUDENT IN AN ORGANIZED HEALTH CARE EDUCATION/TRAINING PROGRAM

## 2024-11-11 PROCEDURE — 1159F MED LIST DOCD IN RCRD: CPT | Performed by: STUDENT IN AN ORGANIZED HEALTH CARE EDUCATION/TRAINING PROGRAM

## 2024-11-11 PROCEDURE — 99441 PR PHYS/QHP TELEPHONE EVALUATION 5-10 MIN: CPT | Performed by: STUDENT IN AN ORGANIZED HEALTH CARE EDUCATION/TRAINING PROGRAM

## 2024-11-11 PROCEDURE — 3060F POS MICROALBUMINURIA REV: CPT | Performed by: STUDENT IN AN ORGANIZED HEALTH CARE EDUCATION/TRAINING PROGRAM

## 2024-11-11 PROCEDURE — 1160F RVW MEDS BY RX/DR IN RCRD: CPT | Performed by: STUDENT IN AN ORGANIZED HEALTH CARE EDUCATION/TRAINING PROGRAM

## 2024-11-11 RX ORDER — SERTRALINE HYDROCHLORIDE 100 MG/1
100 TABLET, FILM COATED ORAL DAILY
Qty: 90 TABLET | Refills: 3 | Status: SHIPPED | OUTPATIENT
Start: 2024-11-11 | End: 2025-11-11

## 2024-11-11 ASSESSMENT — ENCOUNTER SYMPTOMS
CONSTITUTIONAL NEGATIVE: 1
RESPIRATORY NEGATIVE: 1
GASTROINTESTINAL NEGATIVE: 1
CARDIOVASCULAR NEGATIVE: 1

## 2024-11-11 NOTE — PROGRESS NOTES
[facility]: [facility]  TELEHEALTH ENCOUNTER      Fredi Neal is a 64 y.o. male that is presenting today for Follow-up.  This is a telehealth encounter with audio technology. Patient has consented to this type of visit. Duration of encounter: 5 minutes.    Assessment/Plan   Diagnoses and all orders for this visit:  Anxiety  -     Follow Up In Primary Care  -     sertraline (Zoloft) 100 mg tablet; Take 1 tablet (100 mg) by mouth once daily.    Patient now noting marked improvement in his mental health following the most recent change to his sertraline dosing. He would like to continue it at this dose long-term; I think that this is great, prescription sent today. No further evaluation required.    Current Outpatient Medications   Medication Instructions    carvedilol (COREG) 6.25 mg, oral, 2 times daily after meals    cholecalciferol (VITAMIN D3) 50 mcg, oral, Daily    clopidogrel (PLAVIX) 75 mg, oral, Daily    fenofibrate (TRICOR) 145 mg, oral, Daily, Take with food.    lisinopril 20 mg, oral, Daily    LORazepam (ATIVAN) 1 mg, oral, Daily PRN    metFORMIN XR (GLUCOPHAGE-XR) 500 mg, oral, Daily with evening meal, Do not crush, chew, or split.    multivitamin tablet 1 tablet, oral, Daily, One-A-Day Men's 50+ Complete Multivitamin    nitroglycerin (NITROSTAT) 0.4 mg, sublingual, Every 5 min PRN    rosuvastatin (CRESTOR) 40 mg, oral, Daily    sertraline (ZOLOFT) 100 mg, oral, Daily     Subjective   - The patient otherwise feels well and denies any acute symptoms or concerns at this time.  - The patient denies any changes or progression of their chronic medical problems.  - The patient denies any problems or concerns with their medications.      Review of Systems   Constitutional: Negative.    Respiratory: Negative.     Cardiovascular: Negative.    Gastrointestinal: Negative.    All other systems reviewed and are negative.     Objective   There were no vitals filed for this visit.  There is  "no height or weight on file to calculate BMI.  Physical Exam  Vitals (Patient-reported vitals.) reviewed.   Constitutional:       Comments: This is a virtual / telehealth encounter; unable to perform physical exam.       Diagnostic Results   Lab Results   Component Value Date    GLUCOSE 145 (H) 07/26/2024    CALCIUM 8.9 07/26/2024     07/26/2024    K 4.7 07/26/2024    CO2 21 (L) 07/26/2024     07/26/2024    BUN 27 (H) 07/26/2024    CREATININE 1.30 07/26/2024     Lab Results   Component Value Date    ALT 17 07/26/2024    AST 22 07/26/2024    ALKPHOS 33 (L) 07/26/2024    BILITOT <0.2 07/26/2024     Lab Results   Component Value Date    WBC 11.2 07/26/2024    HGB 12.5 (L) 07/26/2024    HCT 38.7 (L) 07/26/2024    MCV 94 07/26/2024     07/26/2024     Lab Results   Component Value Date    CHOL 136 07/26/2024    CHOL 153 12/16/2023    CHOL 156 07/22/2023     Lab Results   Component Value Date    HDL 35.0 (L) 07/26/2024    HDL 38.0 (L) 12/16/2023    HDL 36 (L) 07/22/2023     Lab Results   Component Value Date    LDLCALC 68 07/26/2024    LDLCALC 82 12/16/2023    LDLCALC 96 07/22/2023     Lab Results   Component Value Date    TRIG 164 (H) 07/26/2024    TRIG 165 (H) 12/16/2023    TRIG 120 07/22/2023     No components found for: \"CHOLHDL\"  Lab Results   Component Value Date    HGBA1C 6.7 (H) 07/26/2024     Other labs not included in the list above were reviewed either before or during this encounter.    History   Past Medical History:   Diagnosis Date    Anxiety     ASHD (arteriosclerotic heart disease)     CAD (coronary artery disease)     DJD (degenerative joint disease)     Hyperglycemia     Hyperlipidemia     Hypertension      Past Surgical History:   Procedure Laterality Date    CARDIAC CATHETERIZATION      04/2014     Family History   Problem Relation Name Age of Onset    Cancer Mother      Heart attack Father      Heart disease Father      Diabetes Sister      Sleep apnea Brother      No Known Problems " Brother      No Known Problems Daughter      No Known Problems Son       Social History     Socioeconomic History    Marital status:      Spouse name: Not on file    Number of children: Not on file    Years of education: Not on file    Highest education level: Not on file   Occupational History    Not on file   Tobacco Use    Smoking status: Never    Smokeless tobacco: Never   Substance and Sexual Activity    Alcohol use: Yes     Comment: socially    Drug use: Yes     Types: Marijuana    Sexual activity: Defer   Other Topics Concern    Not on file   Social History Narrative    Not on file     Social Drivers of Health     Financial Resource Strain: Not on file   Food Insecurity: Not on file   Transportation Needs: Not on file   Physical Activity: Not on file   Stress: Not on file   Social Connections: Not on file   Intimate Partner Violence: Not on file   Housing Stability: Not on file     No Known Allergies  Current Outpatient Medications on File Prior to Visit   Medication Sig Dispense Refill    carvedilol (Coreg) 6.25 mg tablet TAKE ONE TABLET BY MOUTH TWO TIMES A DAY WITH FOOD 180 tablet 3    cholecalciferol (Vitamin D3) 50 mcg (2,000 unit) capsule Take 1 capsule (50 mcg) by mouth once daily.      clopidogrel (Plavix) 75 mg tablet TAKE ONE TABLET BY MOUTH EVERY DAY 90 tablet 0    fenofibrate (Tricor) 145 mg tablet TAKE 1 TABLET BY MOUTH ONCE A DAY WITH FOOD 90 tablet 3    lisinopril 20 mg tablet TAKE ONE TABLET BY MOUTH EVERY DAY 90 tablet 3    LORazepam (Ativan) 1 mg tablet TAKE ONE TABLET BY MOUTH EVERY DAY AS NEEDED for anxiety 30 tablet 0    metFORMIN XR (Glucophage-XR) 500 mg 24 hr tablet Take 1 tablet (500 mg) by mouth once daily in the evening. Take with meals. Do not crush, chew, or split. 90 tablet 3    multivitamin tablet Take 1 tablet by mouth once daily. One-A-Day Men's 50+ Complete Multivitamin      nitroglycerin (Nitrostat) 0.4 mg SL tablet Place 1 tablet (0.4 mg) under the tongue every 5  minutes if needed for chest pain.      rosuvastatin (Crestor) 40 mg tablet TAKE ONE TABLET BY MOUTH EVERY DAY 90 tablet 0    [DISCONTINUED] LORazepam (Ativan) 1 mg tablet TAKE ONE TABLET BY MOUTH EVERY DAY AS NEEDED FOR ANXIETY 30 tablet 0    [DISCONTINUED] sertraline (Zoloft) 100 mg tablet Take 1 tablet (100 mg) by mouth once daily. 30 tablet 1     No current facility-administered medications on file prior to visit.     Immunization History   Administered Date(s) Administered    Flu vaccine (IIV4), preservative free *Check age/dose* 10/25/2023    Flu vaccine, quadrivalent, recombinant, preservative free, adult (FLUBLOK) 12/02/2021    Influenza, injectable, quadrivalent 10/01/2020    Pfizer Purple Cap SARS-CoV-2 04/10/2021, 05/01/2021, 12/24/2021    Pneumococcal conjugate vaccine, 20-valent (PREVNAR 20) 01/12/2024    Tdap vaccine, age 7 year and older (BOOSTRIX, ADACEL) 08/23/2017    Zoster vaccine, recombinant, adult (SHINGRIX) 01/04/2023, 05/20/2023     Patient's medical history was reviewed and updated either before or during this encounter.       Cali Chakraborty MD

## 2024-11-16 DIAGNOSIS — I25.10 CORONARY ARTERY DISEASE INVOLVING NATIVE CORONARY ARTERY OF NATIVE HEART WITHOUT ANGINA PECTORIS: ICD-10-CM

## 2024-11-19 RX ORDER — CLOPIDOGREL BISULFATE 75 MG/1
75 TABLET ORAL DAILY
Qty: 90 TABLET | Refills: 0 | Status: SHIPPED | OUTPATIENT
Start: 2024-11-19

## 2024-11-26 ENCOUNTER — APPOINTMENT (OUTPATIENT)
Dept: PRIMARY CARE | Facility: CLINIC | Age: 64
End: 2024-11-26
Payer: COMMERCIAL

## 2024-12-10 DIAGNOSIS — E78.2 MIXED HYPERLIPIDEMIA: ICD-10-CM

## 2024-12-10 DIAGNOSIS — I25.10 CORONARY ARTERY DISEASE INVOLVING NATIVE CORONARY ARTERY OF NATIVE HEART WITHOUT ANGINA PECTORIS: ICD-10-CM

## 2024-12-11 DIAGNOSIS — I25.10 CORONARY ARTERY DISEASE INVOLVING NATIVE CORONARY ARTERY OF NATIVE HEART WITHOUT ANGINA PECTORIS: ICD-10-CM

## 2024-12-11 DIAGNOSIS — E78.2 MIXED HYPERLIPIDEMIA: ICD-10-CM

## 2024-12-11 RX ORDER — ROSUVASTATIN CALCIUM 40 MG/1
40 TABLET, COATED ORAL DAILY
Qty: 90 TABLET | Refills: 0 | OUTPATIENT
Start: 2024-12-11

## 2024-12-11 RX ORDER — ROSUVASTATIN CALCIUM 40 MG/1
40 TABLET, COATED ORAL DAILY
Qty: 90 TABLET | Refills: 0 | Status: SHIPPED | OUTPATIENT
Start: 2024-12-11 | End: 2024-12-13 | Stop reason: SDUPTHER

## 2024-12-13 ENCOUNTER — LAB (OUTPATIENT)
Dept: LAB | Facility: LAB | Age: 64
End: 2024-12-13
Payer: COMMERCIAL

## 2024-12-13 ENCOUNTER — OFFICE VISIT (OUTPATIENT)
Dept: PRIMARY CARE | Facility: CLINIC | Age: 64
End: 2024-12-13
Payer: COMMERCIAL

## 2024-12-13 VITALS
OXYGEN SATURATION: 97 % | BODY MASS INDEX: 28.77 KG/M2 | HEART RATE: 62 BPM | WEIGHT: 201 LBS | HEIGHT: 70 IN | SYSTOLIC BLOOD PRESSURE: 126 MMHG | TEMPERATURE: 97 F | DIASTOLIC BLOOD PRESSURE: 82 MMHG

## 2024-12-13 DIAGNOSIS — I10 PRIMARY HYPERTENSION: ICD-10-CM

## 2024-12-13 DIAGNOSIS — G25.81 RLS (RESTLESS LEGS SYNDROME): ICD-10-CM

## 2024-12-13 DIAGNOSIS — E11.69 TYPE 2 DIABETES MELLITUS WITH OTHER SPECIFIED COMPLICATION, WITHOUT LONG-TERM CURRENT USE OF INSULIN: ICD-10-CM

## 2024-12-13 DIAGNOSIS — N18.31 STAGE 3A CHRONIC KIDNEY DISEASE (MULTI): ICD-10-CM

## 2024-12-13 DIAGNOSIS — F41.9 ANXIETY: ICD-10-CM

## 2024-12-13 DIAGNOSIS — E55.9 VITAMIN D DEFICIENCY: ICD-10-CM

## 2024-12-13 DIAGNOSIS — Z79.899 POLYPHARMACY: Primary | ICD-10-CM

## 2024-12-13 DIAGNOSIS — E78.2 MIXED HYPERLIPIDEMIA: ICD-10-CM

## 2024-12-13 DIAGNOSIS — E03.8 SUBCLINICAL HYPOTHYROIDISM: ICD-10-CM

## 2024-12-13 DIAGNOSIS — M15.0 PRIMARY OSTEOARTHRITIS INVOLVING MULTIPLE JOINTS: ICD-10-CM

## 2024-12-13 DIAGNOSIS — Z00.00 ANNUAL PHYSICAL EXAM: ICD-10-CM

## 2024-12-13 DIAGNOSIS — I25.10 CORONARY ARTERY DISEASE INVOLVING NATIVE CORONARY ARTERY OF NATIVE HEART WITHOUT ANGINA PECTORIS: ICD-10-CM

## 2024-12-13 LAB
ALBUMIN SERPL BCP-MCNC: 4.4 G/DL (ref 3.4–5)
ALP SERPL-CCNC: 32 U/L (ref 33–136)
ALT SERPL W P-5'-P-CCNC: 12 U/L (ref 10–52)
ANION GAP SERPL CALCULATED.3IONS-SCNC: 10 MMOL/L (ref 10–20)
AST SERPL W P-5'-P-CCNC: 16 U/L (ref 9–39)
BILIRUB SERPL-MCNC: 0.3 MG/DL (ref 0–1.2)
BUN SERPL-MCNC: 30 MG/DL (ref 6–23)
CALCIUM SERPL-MCNC: 9.1 MG/DL (ref 8.6–10.3)
CHLORIDE SERPL-SCNC: 103 MMOL/L (ref 98–107)
CO2 SERPL-SCNC: 29 MMOL/L (ref 21–32)
CREAT SERPL-MCNC: 1.42 MG/DL (ref 0.5–1.3)
EGFRCR SERPLBLD CKD-EPI 2021: 54 ML/MIN/1.73M*2
EST. AVERAGE GLUCOSE BLD GHB EST-MCNC: 128 MG/DL
GLUCOSE SERPL-MCNC: 95 MG/DL (ref 74–99)
HBA1C MFR BLD: 6.1 %
POTASSIUM SERPL-SCNC: 4.4 MMOL/L (ref 3.5–5.3)
PROT SERPL-MCNC: 7 G/DL (ref 6.4–8.2)
SODIUM SERPL-SCNC: 138 MMOL/L (ref 136–145)

## 2024-12-13 PROCEDURE — 4010F ACE/ARB THERAPY RXD/TAKEN: CPT | Performed by: STUDENT IN AN ORGANIZED HEALTH CARE EDUCATION/TRAINING PROGRAM

## 2024-12-13 PROCEDURE — 1159F MED LIST DOCD IN RCRD: CPT | Performed by: STUDENT IN AN ORGANIZED HEALTH CARE EDUCATION/TRAINING PROGRAM

## 2024-12-13 PROCEDURE — 1160F RVW MEDS BY RX/DR IN RCRD: CPT | Performed by: STUDENT IN AN ORGANIZED HEALTH CARE EDUCATION/TRAINING PROGRAM

## 2024-12-13 PROCEDURE — 1126F AMNT PAIN NOTED NONE PRSNT: CPT | Performed by: STUDENT IN AN ORGANIZED HEALTH CARE EDUCATION/TRAINING PROGRAM

## 2024-12-13 PROCEDURE — 3074F SYST BP LT 130 MM HG: CPT | Performed by: STUDENT IN AN ORGANIZED HEALTH CARE EDUCATION/TRAINING PROGRAM

## 2024-12-13 PROCEDURE — 83036 HEMOGLOBIN GLYCOSYLATED A1C: CPT

## 2024-12-13 PROCEDURE — 36415 COLL VENOUS BLD VENIPUNCTURE: CPT

## 2024-12-13 PROCEDURE — G2211 COMPLEX E/M VISIT ADD ON: HCPCS | Performed by: STUDENT IN AN ORGANIZED HEALTH CARE EDUCATION/TRAINING PROGRAM

## 2024-12-13 PROCEDURE — 3044F HG A1C LEVEL LT 7.0%: CPT | Performed by: STUDENT IN AN ORGANIZED HEALTH CARE EDUCATION/TRAINING PROGRAM

## 2024-12-13 PROCEDURE — 1036F TOBACCO NON-USER: CPT | Performed by: STUDENT IN AN ORGANIZED HEALTH CARE EDUCATION/TRAINING PROGRAM

## 2024-12-13 PROCEDURE — 3048F LDL-C <100 MG/DL: CPT | Performed by: STUDENT IN AN ORGANIZED HEALTH CARE EDUCATION/TRAINING PROGRAM

## 2024-12-13 PROCEDURE — 80053 COMPREHEN METABOLIC PANEL: CPT

## 2024-12-13 PROCEDURE — 3079F DIAST BP 80-89 MM HG: CPT | Performed by: STUDENT IN AN ORGANIZED HEALTH CARE EDUCATION/TRAINING PROGRAM

## 2024-12-13 PROCEDURE — 99214 OFFICE O/P EST MOD 30 MIN: CPT | Performed by: STUDENT IN AN ORGANIZED HEALTH CARE EDUCATION/TRAINING PROGRAM

## 2024-12-13 PROCEDURE — 3060F POS MICROALBUMINURIA REV: CPT | Performed by: STUDENT IN AN ORGANIZED HEALTH CARE EDUCATION/TRAINING PROGRAM

## 2024-12-13 PROCEDURE — 3008F BODY MASS INDEX DOCD: CPT | Performed by: STUDENT IN AN ORGANIZED HEALTH CARE EDUCATION/TRAINING PROGRAM

## 2024-12-13 RX ORDER — NITROGLYCERIN 0.4 MG/1
0.4 TABLET SUBLINGUAL EVERY 5 MIN PRN
Qty: 90 TABLET | Refills: 0 | Status: SHIPPED | OUTPATIENT
Start: 2024-12-13

## 2024-12-13 RX ORDER — LISINOPRIL 20 MG/1
20 TABLET ORAL DAILY
Qty: 90 TABLET | Refills: 3 | Status: SHIPPED | OUTPATIENT
Start: 2024-12-13

## 2024-12-13 RX ORDER — FENOFIBRATE 145 MG/1
145 TABLET, FILM COATED ORAL DAILY
Qty: 90 TABLET | Refills: 3 | Status: SHIPPED | OUTPATIENT
Start: 2024-12-13

## 2024-12-13 RX ORDER — METFORMIN HYDROCHLORIDE 500 MG/1
500 TABLET, EXTENDED RELEASE ORAL
Qty: 90 TABLET | Refills: 3 | Status: SHIPPED | OUTPATIENT
Start: 2024-12-13 | End: 2025-12-13

## 2024-12-13 RX ORDER — SERTRALINE HYDROCHLORIDE 100 MG/1
100 TABLET, FILM COATED ORAL DAILY
Qty: 90 TABLET | Refills: 3 | Status: SHIPPED | OUTPATIENT
Start: 2024-12-13 | End: 2025-12-13

## 2024-12-13 RX ORDER — CLOPIDOGREL BISULFATE 75 MG/1
75 TABLET ORAL DAILY
Qty: 90 TABLET | Refills: 3 | Status: SHIPPED | OUTPATIENT
Start: 2024-12-13 | End: 2025-12-13

## 2024-12-13 RX ORDER — ROSUVASTATIN CALCIUM 40 MG/1
40 TABLET, COATED ORAL DAILY
Qty: 90 TABLET | Refills: 3 | Status: SHIPPED | OUTPATIENT
Start: 2024-12-13 | End: 2025-12-13

## 2024-12-13 RX ORDER — CARVEDILOL 6.25 MG/1
6.25 TABLET ORAL
Qty: 180 TABLET | Refills: 3 | Status: SHIPPED | OUTPATIENT
Start: 2024-12-13

## 2024-12-13 ASSESSMENT — ENCOUNTER SYMPTOMS
GASTROINTESTINAL NEGATIVE: 1
CARDIOVASCULAR NEGATIVE: 1
RESPIRATORY NEGATIVE: 1
CONSTITUTIONAL NEGATIVE: 1

## 2024-12-13 ASSESSMENT — PATIENT HEALTH QUESTIONNAIRE - PHQ9
SUM OF ALL RESPONSES TO PHQ9 QUESTIONS 1 AND 2: 0
2. FEELING DOWN, DEPRESSED OR HOPELESS: NOT AT ALL
1. LITTLE INTEREST OR PLEASURE IN DOING THINGS: NOT AT ALL

## 2024-12-13 ASSESSMENT — PAIN SCALES - GENERAL: PAINLEVEL_OUTOF10: 0-NO PAIN

## 2024-12-13 NOTE — PROGRESS NOTES
[facility]: [facility]  PROGRESS NOTE      Fredi Neal is a 64 y.o. male that is presenting today for Follow-up.    Assessment/Plan   - Overall, the patient feels well and denies any acute symptoms / concerns at this time.  - Blood pressure at goal today.  - Encouraged continued dietary, exercise, and lifestyle modification.  - Significant medication and problem list reconciliation done today.   - Today's appointment is to keep the patient in compliance with their controlled substance agreement (CSA).   - The patient has already signed their CSA at an earlier appointment.  - The patient has completed their urine drug screen (UDS) this year.  - PDMP reviewed and appropriate.  - I have considered the risks of abuse, dependence, addiction, and/or diversion and have discussed these with the patient during our appointment.  - I do believe that the medication(s) are medically necessary. Patient has tried multiple alternatives to controlled substances in the past with limited success.  - The patient's symptoms are well-controlled on current therapy.  - Patient appears to be due for labwork. Ordered today.  - Patient will not require labwork for their next appointment.    Diagnoses and all orders for this visit:  Polypharmacy  Type 2 diabetes mellitus with other specified complication, without long-term current use of insulin  -     Comprehensive Metabolic Panel; Future  -     Hemoglobin A1C; Future  -     metFORMIN XR (Glucophage-XR) 500 mg 24 hr tablet; Take 1 tablet (500 mg) by mouth once daily in the evening. Take with meals. Do not crush, chew, or split.  Coronary artery disease involving native coronary artery of native heart without angina pectoris  -     carvedilol (Coreg) 6.25 mg tablet; Take 1 tablet (6.25 mg) by mouth 2 times a day after meals.  -     clopidogrel (Plavix) 75 mg tablet; Take 1 tablet (75 mg) by mouth once daily.  -     fenofibrate (Tricor) 145 mg tablet; Take 1 tablet  (145 mg) by mouth once daily. Take with food.  -     rosuvastatin (Crestor) 40 mg tablet; Take 1 tablet (40 mg) by mouth once daily.  -     nitroglycerin (Nitrostat) 0.4 mg SL tablet; Place 1 tablet (0.4 mg) under the tongue every 5 minutes if needed for chest pain.  Primary osteoarthritis involving multiple joints  Stage 3a chronic kidney disease (Multi)  -     Comprehensive Metabolic Panel; Future  RLS (restless legs syndrome)  Subclinical hypothyroidism  Mixed hyperlipidemia  -     fenofibrate (Tricor) 145 mg tablet; Take 1 tablet (145 mg) by mouth once daily. Take with food.  -     rosuvastatin (Crestor) 40 mg tablet; Take 1 tablet (40 mg) by mouth once daily.  Primary hypertension  -     carvedilol (Coreg) 6.25 mg tablet; Take 1 tablet (6.25 mg) by mouth 2 times a day after meals.  -     lisinopril 20 mg tablet; Take 1 tablet (20 mg) by mouth once daily.  Vitamin D deficiency  Anxiety  -     sertraline (Zoloft) 100 mg tablet; Take 1 tablet (100 mg) by mouth once daily.    Current Outpatient Medications   Medication Instructions    carvedilol (COREG) 6.25 mg, oral, 2 times daily after meals    cholecalciferol (VITAMIN D3) 50 mcg, oral, Daily    clopidogrel (PLAVIX) 75 mg, oral, Daily    fenofibrate (TRICOR) 145 mg, oral, Daily, Take with food.    lisinopril 20 mg, oral, Daily    LORazepam (ATIVAN) 1 mg, oral, Daily PRN    metFORMIN XR (GLUCOPHAGE-XR) 500 mg, oral, Daily with evening meal, Do not crush, chew, or split.    multivitamin tablet 1 tablet, oral, Daily, One-A-Day Men's 50+ Complete Multivitamin    nitroglycerin (NITROSTAT) 0.4 mg, sublingual, Every 5 min PRN    rosuvastatin (CRESTOR) 40 mg, oral, Daily    sertraline (ZOLOFT) 100 mg, oral, Daily     Subjective   - The patient otherwise feels well and denies any acute symptoms or concerns at this time.  - The patient denies any changes or progression of their chronic medical problems.  - The patient denies any problems or concerns with their  "medications.      Review of Systems   Constitutional: Negative.    Respiratory: Negative.     Cardiovascular: Negative.    Gastrointestinal: Negative.    All other systems reviewed and are negative.     Objective   Vitals:    12/13/24 1321   BP: 126/82   Pulse: 62   Temp: 36.1 °C (97 °F)   SpO2: 97%      Body mass index is 28.84 kg/m².  Physical Exam  Vitals and nursing note reviewed.   Constitutional:       General: He is not in acute distress.  Neck:      Vascular: No carotid bruit.   Cardiovascular:      Rate and Rhythm: Normal rate and regular rhythm.      Heart sounds: Normal heart sounds.   Pulmonary:      Effort: Pulmonary effort is normal.      Breath sounds: Normal breath sounds.   Musculoskeletal:         General: No swelling.   Neurological:      Mental Status: He is alert. Mental status is at baseline.   Psychiatric:         Mood and Affect: Mood normal.       Diagnostic Results   Lab Results   Component Value Date    GLUCOSE 145 (H) 07/26/2024    CALCIUM 8.9 07/26/2024     07/26/2024    K 4.7 07/26/2024    CO2 21 (L) 07/26/2024     07/26/2024    BUN 27 (H) 07/26/2024    CREATININE 1.30 07/26/2024     Lab Results   Component Value Date    ALT 17 07/26/2024    AST 22 07/26/2024    ALKPHOS 33 (L) 07/26/2024    BILITOT <0.2 07/26/2024     Lab Results   Component Value Date    WBC 11.2 07/26/2024    HGB 12.5 (L) 07/26/2024    HCT 38.7 (L) 07/26/2024    MCV 94 07/26/2024     07/26/2024     Lab Results   Component Value Date    CHOL 136 07/26/2024    CHOL 153 12/16/2023    CHOL 156 07/22/2023     Lab Results   Component Value Date    HDL 35.0 (L) 07/26/2024    HDL 38.0 (L) 12/16/2023    HDL 36 (L) 07/22/2023     Lab Results   Component Value Date    LDLCALC 68 07/26/2024    LDLCALC 82 12/16/2023    LDLCALC 96 07/22/2023     Lab Results   Component Value Date    TRIG 164 (H) 07/26/2024    TRIG 165 (H) 12/16/2023    TRIG 120 07/22/2023     No components found for: \"CHOLHDL\"  Lab Results "   Component Value Date    HGBA1C 6.7 (H) 07/26/2024     Other labs not included in the list above were reviewed either before or during this encounter.    History    Past Medical History:   Diagnosis Date    Anxiety     ASHD (arteriosclerotic heart disease)     CAD (coronary artery disease)     DJD (degenerative joint disease)     Hyperglycemia     Hyperlipidemia     Hypertension      Past Surgical History:   Procedure Laterality Date    CARDIAC CATHETERIZATION      04/2014     Family History   Problem Relation Name Age of Onset    Cancer Mother      Heart attack Father      Heart disease Father      Diabetes Sister      Sleep apnea Brother      No Known Problems Brother      No Known Problems Daughter      No Known Problems Son       Social History     Socioeconomic History    Marital status:      Spouse name: Not on file    Number of children: Not on file    Years of education: Not on file    Highest education level: Not on file   Occupational History    Not on file   Tobacco Use    Smoking status: Never    Smokeless tobacco: Never   Substance and Sexual Activity    Alcohol use: Yes     Comment: socially    Drug use: Yes     Types: Marijuana    Sexual activity: Defer   Other Topics Concern    Not on file   Social History Narrative    Not on file     Social Drivers of Health     Financial Resource Strain: Not on file   Food Insecurity: Not on file   Transportation Needs: Not on file   Physical Activity: Not on file   Stress: Not on file   Social Connections: Not on file   Intimate Partner Violence: Not on file   Housing Stability: Not on file     No Known Allergies  Current Outpatient Medications on File Prior to Visit   Medication Sig Dispense Refill    cholecalciferol (Vitamin D3) 50 mcg (2,000 unit) capsule Take 1 capsule (50 mcg) by mouth once daily.      LORazepam (Ativan) 1 mg tablet TAKE ONE TABLET BY MOUTH EVERY DAY AS NEEDED for anxiety 30 tablet 0    multivitamin tablet Take 1 tablet by mouth once  daily. One-A-Day Men's 50+ Complete Multivitamin      [DISCONTINUED] carvedilol (Coreg) 6.25 mg tablet TAKE ONE TABLET BY MOUTH TWO TIMES A DAY WITH FOOD 180 tablet 3    [DISCONTINUED] clopidogrel (Plavix) 75 mg tablet TAKE ONE TABLET BY MOUTH EVERY DAY 90 tablet 0    [DISCONTINUED] fenofibrate (Tricor) 145 mg tablet TAKE 1 TABLET BY MOUTH ONCE A DAY WITH FOOD 90 tablet 3    [DISCONTINUED] lisinopril 20 mg tablet TAKE ONE TABLET BY MOUTH EVERY DAY 90 tablet 3    [DISCONTINUED] metFORMIN XR (Glucophage-XR) 500 mg 24 hr tablet Take 1 tablet (500 mg) by mouth once daily in the evening. Take with meals. Do not crush, chew, or split. 90 tablet 3    [DISCONTINUED] nitroglycerin (Nitrostat) 0.4 mg SL tablet Place 1 tablet (0.4 mg) under the tongue every 5 minutes if needed for chest pain.      [DISCONTINUED] rosuvastatin (Crestor) 40 mg tablet Take 1 tablet (40 mg) by mouth once daily. 90 tablet 0    [DISCONTINUED] sertraline (Zoloft) 100 mg tablet Take 1 tablet (100 mg) by mouth once daily. 90 tablet 3    [DISCONTINUED] rosuvastatin (Crestor) 40 mg tablet TAKE ONE TABLET BY MOUTH EVERY DAY 90 tablet 0     No current facility-administered medications on file prior to visit.     Immunization History   Administered Date(s) Administered    Flu vaccine (IIV4), preservative free *Check age/dose* 10/25/2023    Flu vaccine, quadrivalent, recombinant, preservative free, adult (FLUBLOK) 12/02/2021    Influenza, injectable, quadrivalent 10/01/2020    Pfizer Purple Cap SARS-CoV-2 04/10/2021, 05/01/2021, 12/24/2021    Pneumococcal conjugate vaccine, 20-valent (PREVNAR 20) 01/12/2024    Tdap vaccine, age 7 year and older (BOOSTRIX, ADACEL) 08/23/2017    Zoster vaccine, recombinant, adult (SHINGRIX) 01/04/2023, 05/20/2023     Patient's medical history was reviewed and updated either before or during this encounter.       Cali Chakraborty MD

## 2024-12-13 NOTE — PATIENT INSTRUCTIONS
- Overall, the patient feels well and denies any acute symptoms / concerns at this time.  - Blood pressure at goal today.  - Encouraged continued dietary, exercise, and lifestyle modification.  - Significant medication and problem list reconciliation done today.   - Today's appointment is to keep the patient in compliance with their controlled substance agreement (CSA).   - The patient has already signed their CSA at an earlier appointment.  - The patient has completed their urine drug screen (UDS) this year.  - PDMP reviewed and appropriate.  - I have considered the risks of abuse, dependence, addiction, and/or diversion and have discussed these with the patient during our appointment.  - I do believe that the medication(s) are medically necessary. Patient has tried multiple alternatives to controlled substances in the past with limited success.  - The patient's symptoms are well-controlled on current therapy.  - Patient appears to be due for labwork. Ordered today.  - Patient will not require labwork for their next appointment.

## 2024-12-27 DIAGNOSIS — F41.9 ANXIETY: ICD-10-CM

## 2024-12-30 RX ORDER — LORAZEPAM 1 MG/1
1 TABLET ORAL DAILY PRN
Qty: 30 TABLET | Refills: 0 | Status: SHIPPED | OUTPATIENT
Start: 2024-12-30

## 2025-01-08 ENCOUNTER — APPOINTMENT (OUTPATIENT)
Dept: CARDIOLOGY | Facility: CLINIC | Age: 65
End: 2025-01-08
Payer: COMMERCIAL

## 2025-01-16 ENCOUNTER — APPOINTMENT (OUTPATIENT)
Dept: CARDIOLOGY | Facility: CLINIC | Age: 65
End: 2025-01-16
Payer: COMMERCIAL

## 2025-02-05 NOTE — ASSESSMENT & PLAN NOTE
LDL cholesterol was 68 on lipid panel drawn in July 2024.  Will plan on repeating fasting lipid panel on return visit.  At this point continue the rosuvastatin and fenofibrate therapy.

## 2025-02-13 ENCOUNTER — OFFICE VISIT (OUTPATIENT)
Dept: CARDIOLOGY | Facility: CLINIC | Age: 65
End: 2025-02-13
Payer: COMMERCIAL

## 2025-02-13 VITALS
DIASTOLIC BLOOD PRESSURE: 70 MMHG | OXYGEN SATURATION: 96 % | BODY MASS INDEX: 28.55 KG/M2 | SYSTOLIC BLOOD PRESSURE: 114 MMHG | WEIGHT: 199 LBS | HEART RATE: 73 BPM

## 2025-02-13 DIAGNOSIS — I10 PRIMARY HYPERTENSION: ICD-10-CM

## 2025-02-13 DIAGNOSIS — I25.10 CORONARY ARTERY DISEASE INVOLVING NATIVE CORONARY ARTERY OF NATIVE HEART, UNSPECIFIED WHETHER ANGINA PRESENT: Primary | ICD-10-CM

## 2025-02-13 DIAGNOSIS — E78.2 MIXED HYPERLIPIDEMIA: ICD-10-CM

## 2025-02-13 PROCEDURE — 3078F DIAST BP <80 MM HG: CPT | Performed by: INTERNAL MEDICINE

## 2025-02-13 PROCEDURE — 1125F AMNT PAIN NOTED PAIN PRSNT: CPT | Performed by: INTERNAL MEDICINE

## 2025-02-13 PROCEDURE — 99213 OFFICE O/P EST LOW 20 MIN: CPT | Performed by: INTERNAL MEDICINE

## 2025-02-13 PROCEDURE — 1036F TOBACCO NON-USER: CPT | Performed by: INTERNAL MEDICINE

## 2025-02-13 PROCEDURE — 3074F SYST BP LT 130 MM HG: CPT | Performed by: INTERNAL MEDICINE

## 2025-02-13 PROCEDURE — 1159F MED LIST DOCD IN RCRD: CPT | Performed by: INTERNAL MEDICINE

## 2025-02-13 PROCEDURE — 4010F ACE/ARB THERAPY RXD/TAKEN: CPT | Performed by: INTERNAL MEDICINE

## 2025-02-13 ASSESSMENT — ENCOUNTER SYMPTOMS
DYSPNEA ON EXERTION: 0
SHORTNESS OF BREATH: 0
HEMATURIA: 0
DEPRESSION: 0
ABDOMINAL PAIN: 0
BLURRED VISION: 0
OCCASIONAL FEELINGS OF UNSTEADINESS: 0
LOSS OF SENSATION IN FEET: 0
NUMBNESS: 0
DYSURIA: 0
COUGH: 0
PALPITATIONS: 0
PARESTHESIAS: 0

## 2025-02-13 ASSESSMENT — PATIENT HEALTH QUESTIONNAIRE - PHQ9
2. FEELING DOWN, DEPRESSED OR HOPELESS: NOT AT ALL
1. LITTLE INTEREST OR PLEASURE IN DOING THINGS: NOT AT ALL
SUM OF ALL RESPONSES TO PHQ9 QUESTIONS 1 AND 2: 0

## 2025-02-13 ASSESSMENT — LIFESTYLE VARIABLES: TOTAL SCORE: 0

## 2025-02-13 ASSESSMENT — PAIN SCALES - GENERAL: PAINLEVEL_OUTOF10: 2

## 2025-02-13 NOTE — PROGRESS NOTES
Subjective   Fredi Neal is a 65 y.o. male.    Chief Complaint:  Follow-up    HPI  Overall patient doing very well.  No chest pain or anginal type symptoms.  Working on a regular basis without restriction or difficulty.    Review of Systems   Constitutional: Negative for malaise/fatigue.   HENT:  Negative for congestion.    Eyes:  Negative for blurred vision.   Cardiovascular:  Negative for chest pain, dyspnea on exertion and palpitations.   Respiratory:  Negative for cough and shortness of breath.    Musculoskeletal:  Positive for arthritis. Negative for joint pain.   Gastrointestinal:  Negative for abdominal pain.   Genitourinary:  Negative for dysuria and hematuria.   Neurological:  Negative for numbness and paresthesias.       Objective   Constitutional:       Appearance: Not in distress.   Eyes:      Conjunctiva/sclera: Conjunctivae normal.   Neck:      Vascular: JVD normal.   Pulmonary:      Breath sounds: Normal breath sounds. No wheezing. No rhonchi. No rales.   Cardiovascular:      Normal rate. Regular rhythm.      Murmurs: There is no murmur.      No gallop.  No click. No rub.   Abdominal:      Palpations: Abdomen is soft.   Neurological:      General: No focal deficit present.      Mental Status: Alert.         Lab Review:       Assessment/Plan   The primary encounter diagnosis was Coronary artery disease involving native coronary artery of native heart, unspecified whether angina present. Diagnoses of Mixed hyperlipidemia and Primary hypertension were also pertinent to this visit.    HLD (hyperlipidemia)  LDL cholesterol was 68 on lipid panel drawn in July 2024.  Will plan on repeating fasting lipid panel on return visit.  At this point continue the rosuvastatin and fenofibrate therapy.    CAD (coronary artery disease)  Cardiac wise stable with no chest pain or anginal type symptoms.  Remains active without significant restrictions.  We will continue standard risk factor modification and will follow  on a clinical basis.    HTN (hypertension)  Blood pressure very well-controlled on the combination of carvedilol and lisinopril.  No change necessary at this time.  Will check a basic metabolic panel on return visit.

## 2025-02-13 NOTE — ASSESSMENT & PLAN NOTE
Blood pressure very well-controlled on the combination of carvedilol and lisinopril.  No change necessary at this time.  Will check a basic metabolic panel on return visit.

## 2025-02-13 NOTE — ASSESSMENT & PLAN NOTE
Cardiac wise stable with no chest pain or anginal type symptoms.  Remains active without significant restrictions.  We will continue standard risk factor modification and will follow on a clinical basis.

## 2025-02-19 DIAGNOSIS — F41.9 ANXIETY: ICD-10-CM

## 2025-02-21 RX ORDER — LORAZEPAM 1 MG/1
1 TABLET ORAL DAILY PRN
Qty: 30 TABLET | Refills: 0 | Status: SHIPPED | OUTPATIENT
Start: 2025-02-21

## 2025-03-12 ENCOUNTER — APPOINTMENT (OUTPATIENT)
Dept: PRIMARY CARE | Facility: CLINIC | Age: 65
End: 2025-03-12
Payer: COMMERCIAL

## 2025-03-25 ENCOUNTER — APPOINTMENT (OUTPATIENT)
Dept: PRIMARY CARE | Facility: CLINIC | Age: 65
End: 2025-03-25
Payer: COMMERCIAL

## 2025-04-01 ENCOUNTER — TELEPHONE (OUTPATIENT)
Dept: PRIMARY CARE | Facility: CLINIC | Age: 65
End: 2025-04-01
Payer: COMMERCIAL

## 2025-04-01 NOTE — TELEPHONE ENCOUNTER
Per spouse, pt c/o constipation and cramping x3-4 days. States that he recently had stents put. States that he has taken 1 dulcolax tab 2 days in a row but does not feel like he is emptying bowels completely.

## 2025-05-12 ENCOUNTER — OFFICE VISIT (OUTPATIENT)
Dept: PRIMARY CARE | Facility: CLINIC | Age: 65
End: 2025-05-12
Payer: COMMERCIAL

## 2025-05-12 VITALS
OXYGEN SATURATION: 97 % | BODY MASS INDEX: 28.77 KG/M2 | TEMPERATURE: 97.2 F | WEIGHT: 201 LBS | HEART RATE: 69 BPM | SYSTOLIC BLOOD PRESSURE: 124 MMHG | HEIGHT: 70 IN | DIASTOLIC BLOOD PRESSURE: 84 MMHG

## 2025-05-12 DIAGNOSIS — Z01.89 ENCOUNTER FOR ROUTINE LABORATORY TESTING: ICD-10-CM

## 2025-05-12 DIAGNOSIS — I10 PRIMARY HYPERTENSION: ICD-10-CM

## 2025-05-12 DIAGNOSIS — E03.8 SUBCLINICAL HYPOTHYROIDISM: ICD-10-CM

## 2025-05-12 DIAGNOSIS — F41.9 ANXIETY: ICD-10-CM

## 2025-05-12 DIAGNOSIS — Z79.899 POLYPHARMACY: Primary | ICD-10-CM

## 2025-05-12 DIAGNOSIS — N18.31 STAGE 3A CHRONIC KIDNEY DISEASE (MULTI): ICD-10-CM

## 2025-05-12 DIAGNOSIS — E11.69 TYPE 2 DIABETES MELLITUS WITH OTHER SPECIFIED COMPLICATION, WITHOUT LONG-TERM CURRENT USE OF INSULIN: ICD-10-CM

## 2025-05-12 DIAGNOSIS — E55.9 VITAMIN D DEFICIENCY: ICD-10-CM

## 2025-05-12 DIAGNOSIS — Z12.5 ENCOUNTER FOR PROSTATE CANCER SCREENING: ICD-10-CM

## 2025-05-12 DIAGNOSIS — E78.2 MIXED HYPERLIPIDEMIA: ICD-10-CM

## 2025-05-12 PROCEDURE — 99214 OFFICE O/P EST MOD 30 MIN: CPT | Performed by: STUDENT IN AN ORGANIZED HEALTH CARE EDUCATION/TRAINING PROGRAM

## 2025-05-12 PROCEDURE — 1159F MED LIST DOCD IN RCRD: CPT | Performed by: STUDENT IN AN ORGANIZED HEALTH CARE EDUCATION/TRAINING PROGRAM

## 2025-05-12 PROCEDURE — 3074F SYST BP LT 130 MM HG: CPT | Performed by: STUDENT IN AN ORGANIZED HEALTH CARE EDUCATION/TRAINING PROGRAM

## 2025-05-12 PROCEDURE — 3008F BODY MASS INDEX DOCD: CPT | Performed by: STUDENT IN AN ORGANIZED HEALTH CARE EDUCATION/TRAINING PROGRAM

## 2025-05-12 PROCEDURE — G2211 COMPLEX E/M VISIT ADD ON: HCPCS | Performed by: STUDENT IN AN ORGANIZED HEALTH CARE EDUCATION/TRAINING PROGRAM

## 2025-05-12 PROCEDURE — 1126F AMNT PAIN NOTED NONE PRSNT: CPT | Performed by: STUDENT IN AN ORGANIZED HEALTH CARE EDUCATION/TRAINING PROGRAM

## 2025-05-12 PROCEDURE — 1036F TOBACCO NON-USER: CPT | Performed by: STUDENT IN AN ORGANIZED HEALTH CARE EDUCATION/TRAINING PROGRAM

## 2025-05-12 PROCEDURE — 4010F ACE/ARB THERAPY RXD/TAKEN: CPT | Performed by: STUDENT IN AN ORGANIZED HEALTH CARE EDUCATION/TRAINING PROGRAM

## 2025-05-12 PROCEDURE — 3079F DIAST BP 80-89 MM HG: CPT | Performed by: STUDENT IN AN ORGANIZED HEALTH CARE EDUCATION/TRAINING PROGRAM

## 2025-05-12 PROCEDURE — 1160F RVW MEDS BY RX/DR IN RCRD: CPT | Performed by: STUDENT IN AN ORGANIZED HEALTH CARE EDUCATION/TRAINING PROGRAM

## 2025-05-12 ASSESSMENT — PAIN SCALES - GENERAL: PAINLEVEL_OUTOF10: 0-NO PAIN

## 2025-05-12 NOTE — PROGRESS NOTES
[facility]: [facility]  PROGRESS NOTE      Fredi Neal is a 65 y.o. male that is presenting today for Follow-up.    Assessment/Plan   - Overall, the patient feels well and denies any acute symptoms / concerns at this time.  - Blood pressure at goal today.  - Encouraged continued dietary, exercise, and lifestyle modification.  - Significant medication and problem list reconciliation done today.   - Today's appointment is to keep the patient in compliance with their controlled substance agreement (CSA).   - The patient has already signed their CSA at an earlier appointment.  - The patient has completed their urine drug screen (UDS) this year.  - I have considered the risks of abuse, dependence, addiction, and/or diversion and have discussed these with the patient during our appointment.  - I do believe that the medication(s) are medically necessary. Patient has tried multiple alternatives to controlled substances in the past with limited success.  - The patient's symptoms are well-controlled on current therapy.  - PDMP reviewed and appropriate.    - Labwork:   - Patient did not require labwork for this appointment.   - Will order labwork for the patient's next appointment. Encouraged the patient to get this labwork done one week prior to the next appointment.    Diagnoses and all orders for this visit:  Polypharmacy  -     Follow Up In Primary Care  -     Opiate/Opioid/Benzo Prescription Compliance; Future  Primary hypertension  -     Follow Up In Primary Care  Anxiety  -     Follow Up In Primary Care  Encounter for routine laboratory testing  Encounter for prostate cancer screening  -     Prostate Specific Antigen; Future  Type 2 diabetes mellitus with other specified complication, without long-term current use of insulin  -     Hemoglobin A1C; Future  -     Albumin-Creatinine Ratio, Urine Random; Future  Stage 3a chronic kidney disease (Multi)  -     CBC and Auto Differential; Future  -      Basic Metabolic Panel; Future  -     Albumin-Creatinine Ratio, Urine Random; Future  Subclinical hypothyroidism  -     TSH with reflex to Free T4 if abnormal; Future  Mixed hyperlipidemia  -     Hepatic Function Panel; Future  -     Lipid Panel; Future  Vitamin D deficiency  -     Vitamin D 25-Hydroxy,Total (for eval of Vitamin D levels); Future    Current Outpatient Medications   Medication Instructions    carvedilol (COREG) 6.25 mg, oral, 2 times daily after meals    clopidogrel (PLAVIX) 75 mg, oral, Daily    fenofibrate (TRICOR) 145 mg, oral, Daily, Take with food.    lisinopril 20 mg, oral, Daily    LORazepam (ATIVAN) 1 mg, oral, Daily PRN    metFORMIN XR (GLUCOPHAGE-XR) 500 mg, oral, Daily with evening meal, Do not crush, chew, or split.    nitroglycerin (NITROSTAT) 0.4 mg, sublingual, Every 5 min PRN    rosuvastatin (CRESTOR) 40 mg, oral, Daily    sertraline (ZOLOFT) 100 mg, oral, Daily     Subjective   HPI  Review of Systems   Objective   Vitals:    05/12/25 1506   BP: 124/84   Pulse: 69   Temp: 36.2 °C (97.2 °F)   SpO2: 97%      Body mass index is 28.84 kg/m².  Physical Exam  Diagnostic Results   Lab Results   Component Value Date    GLUCOSE 95 12/13/2024    CALCIUM 9.1 12/13/2024     12/13/2024    K 4.4 12/13/2024    CO2 29 12/13/2024     12/13/2024    BUN 30 (H) 12/13/2024    CREATININE 1.42 (H) 12/13/2024     Lab Results   Component Value Date    ALT 12 12/13/2024    AST 16 12/13/2024    ALKPHOS 32 (L) 12/13/2024    BILITOT 0.3 12/13/2024     Lab Results   Component Value Date    WBC 11.2 07/26/2024    HGB 12.5 (L) 07/26/2024    HCT 38.7 (L) 07/26/2024    MCV 94 07/26/2024     07/26/2024     Lab Results   Component Value Date    CHOL 136 07/26/2024    CHOL 153 12/16/2023    CHOL 156 07/22/2023     Lab Results   Component Value Date    HDL 35.0 (L) 07/26/2024    HDL 38.0 (L) 12/16/2023    HDL 36 (L) 07/22/2023     Lab Results   Component Value Date    LDLCALC 68 07/26/2024    LDLCALC  "82 12/16/2023    LDLCALC 96 07/22/2023     Lab Results   Component Value Date    TRIG 164 (H) 07/26/2024    TRIG 165 (H) 12/16/2023    TRIG 120 07/22/2023     No components found for: \"CHOLHDL\"  Lab Results   Component Value Date    HGBA1C 6.1 (H) 12/13/2024     Other labs not included in the list above were reviewed either before or during this encounter.    History    Medical History[1]  Surgical History[2]  Family History[3]  Social History     Socioeconomic History    Marital status:      Spouse name: Not on file    Number of children: Not on file    Years of education: Not on file    Highest education level: Not on file   Occupational History    Not on file   Tobacco Use    Smoking status: Never    Smokeless tobacco: Never   Substance and Sexual Activity    Alcohol use: Yes     Comment: socially    Drug use: Yes     Types: Marijuana    Sexual activity: Defer   Other Topics Concern    Not on file   Social History Narrative    Not on file     Social Drivers of Health     Financial Resource Strain: Not on file   Food Insecurity: Not on file   Transportation Needs: Not on file   Physical Activity: Not on file   Stress: Not on file   Social Connections: Not on file   Intimate Partner Violence: Not on file   Housing Stability: Not on file     Allergies[4]  Medications Ordered Prior to Encounter[5]  Immunization History   Administered Date(s) Administered    Flu vaccine (IIV4), preservative free *Check age/dose* 10/25/2023    Flu vaccine, quadrivalent, recombinant, preservative free, adult (FLUBLOK) 12/02/2021    Influenza, injectable, quadrivalent 10/01/2020    Pfizer Purple Cap SARS-CoV-2 04/10/2021, 05/01/2021, 12/24/2021    Pneumococcal conjugate vaccine, 20-valent (PREVNAR 20) 01/12/2024    Tdap vaccine, age 7 year and older (BOOSTRIX, ADACEL) 08/23/2017    Zoster vaccine, recombinant, adult (SHINGRIX) 01/04/2023, 05/20/2023     Patient's medical history was reviewed and updated either before or during " this encounter.       Cali Chakraborty MD       [1]   Past Medical History:  Diagnosis Date    Anxiety     ASHD (arteriosclerotic heart disease)     CAD (coronary artery disease)     DJD (degenerative joint disease)     Hyperglycemia     Hyperlipidemia     Hypertension    [2]   Past Surgical History:  Procedure Laterality Date    CARDIAC CATHETERIZATION      04/2014   [3]   Family History  Problem Relation Name Age of Onset    Cancer Mother      Heart attack Father      Heart disease Father      Diabetes Sister      Sleep apnea Brother      No Known Problems Brother      No Known Problems Daughter      No Known Problems Son     [4] No Known Allergies  [5]   Current Outpatient Medications on File Prior to Visit   Medication Sig Dispense Refill    carvedilol (Coreg) 6.25 mg tablet Take 1 tablet (6.25 mg) by mouth 2 times a day after meals. 180 tablet 3    clopidogrel (Plavix) 75 mg tablet Take 1 tablet (75 mg) by mouth once daily. 90 tablet 3    fenofibrate (Tricor) 145 mg tablet Take 1 tablet (145 mg) by mouth once daily. Take with food. 90 tablet 3    lisinopril 20 mg tablet Take 1 tablet (20 mg) by mouth once daily. 90 tablet 3    LORazepam (Ativan) 1 mg tablet TAKE ONE TABLET BY MOUTH EVERY DAY AS NEEDED for anxiety 30 tablet 0    metFORMIN XR (Glucophage-XR) 500 mg 24 hr tablet Take 1 tablet (500 mg) by mouth once daily in the evening. Take with meals. Do not crush, chew, or split. 90 tablet 3    nitroglycerin (Nitrostat) 0.4 mg SL tablet Place 1 tablet (0.4 mg) under the tongue every 5 minutes if needed for chest pain. 90 tablet 0    rosuvastatin (Crestor) 40 mg tablet Take 1 tablet (40 mg) by mouth once daily. 90 tablet 3    sertraline (Zoloft) 100 mg tablet Take 1 tablet (100 mg) by mouth once daily. 90 tablet 3    [DISCONTINUED] cholecalciferol (Vitamin D3) 50 mcg (2,000 unit) capsule Take 1 capsule (50 mcg) by mouth once daily. (Patient not taking: Reported on 5/12/2025)      [DISCONTINUED] multivitamin  tablet Take 1 tablet by mouth once daily. One-A-Day Men's 50+ Complete Multivitamin (Patient not taking: Reported on 5/12/2025)       No current facility-administered medications on file prior to visit.

## 2025-06-12 DIAGNOSIS — E11.69 TYPE 2 DIABETES MELLITUS WITH OTHER SPECIFIED COMPLICATION, WITHOUT LONG-TERM CURRENT USE OF INSULIN: ICD-10-CM

## 2025-06-12 DIAGNOSIS — Z12.5 ENCOUNTER FOR PROSTATE CANCER SCREENING: ICD-10-CM

## 2025-06-12 DIAGNOSIS — Z79.899 POLYPHARMACY: ICD-10-CM

## 2025-06-12 DIAGNOSIS — E78.2 MIXED HYPERLIPIDEMIA: ICD-10-CM

## 2025-06-12 DIAGNOSIS — N18.31 STAGE 3A CHRONIC KIDNEY DISEASE (MULTI): ICD-10-CM

## 2025-06-12 DIAGNOSIS — E55.9 VITAMIN D DEFICIENCY: ICD-10-CM

## 2025-06-12 DIAGNOSIS — E03.8 SUBCLINICAL HYPOTHYROIDISM: ICD-10-CM

## 2025-06-12 LAB
25(OH)D3+25(OH)D2 SERPL-MCNC: 17 NG/ML (ref 30–100)
ALBUMIN SERPL-MCNC: 4.3 G/DL (ref 3.6–5.1)
ALBUMIN/CREAT UR: 20 MG/G CREAT
ALBUMIN/GLOB SERPL: 1.5 (CALC) (ref 1–2.5)
ALP SERPL-CCNC: 22 U/L (ref 35–144)
ALT SERPL-CCNC: 10 U/L (ref 9–46)
ANION GAP SERPL CALCULATED.4IONS-SCNC: 12 MMOL/L (CALC) (ref 7–17)
AST SERPL-CCNC: 16 U/L (ref 10–35)
BASOPHILS # BLD AUTO: 47 CELLS/UL (ref 0–200)
BASOPHILS NFR BLD AUTO: 0.6 %
BILIRUB DIRECT SERPL-MCNC: 0.1 MG/DL
BILIRUB INDIRECT SERPL-MCNC: 0.2 MG/DL (CALC) (ref 0.2–1.2)
BILIRUB SERPL-MCNC: 0.3 MG/DL (ref 0.2–1.2)
BUN SERPL-MCNC: 24 MG/DL (ref 7–25)
BUN/CREAT SERPL: 17 (CALC) (ref 6–22)
CALCIUM SERPL-MCNC: 9.1 MG/DL (ref 8.6–10.3)
CHLORIDE SERPL-SCNC: 104 MMOL/L (ref 98–110)
CHOLEST SERPL-MCNC: 141 MG/DL
CHOLEST/HDLC SERPL: 2.6 (CALC)
CO2 SERPL-SCNC: 22 MMOL/L (ref 20–32)
CREAT SERPL-MCNC: 1.38 MG/DL (ref 0.7–1.35)
CREAT UR-MCNC: 110 MG/DL (ref 20–320)
EGFRCR SERPLBLD CKD-EPI 2021: 56 ML/MIN/1.73M2
EOSINOPHIL # BLD AUTO: 340 CELLS/UL (ref 15–500)
EOSINOPHIL NFR BLD AUTO: 4.3 %
ERYTHROCYTE [DISTWIDTH] IN BLOOD BY AUTOMATED COUNT: 13.5 % (ref 11–15)
EST. AVERAGE GLUCOSE BLD GHB EST-MCNC: 131 MG/DL
EST. AVERAGE GLUCOSE BLD GHB EST-SCNC: 7.3 MMOL/L
GLOBULIN SER CALC-MCNC: 2.9 G/DL (CALC) (ref 1.9–3.7)
GLUCOSE SERPL-MCNC: 116 MG/DL (ref 65–99)
HBA1C MFR BLD: 6.2 %
HCT VFR BLD AUTO: 37.1 % (ref 38.5–50)
HDLC SERPL-MCNC: 55 MG/DL
HGB BLD-MCNC: 11.9 G/DL (ref 13.2–17.1)
LDLC SERPL CALC-MCNC: 68 MG/DL (CALC)
LYMPHOCYTES # BLD AUTO: 2046 CELLS/UL (ref 850–3900)
LYMPHOCYTES NFR BLD AUTO: 25.9 %
MCH RBC QN AUTO: 30.1 PG (ref 27–33)
MCHC RBC AUTO-ENTMCNC: 32.1 G/DL (ref 32–36)
MCV RBC AUTO: 93.9 FL (ref 80–100)
MICROALBUMIN UR-MCNC: 2.2 MG/DL
MONOCYTES # BLD AUTO: 695 CELLS/UL (ref 200–950)
MONOCYTES NFR BLD AUTO: 8.8 %
NEUTROPHILS # BLD AUTO: 4772 CELLS/UL (ref 1500–7800)
NEUTROPHILS NFR BLD AUTO: 60.4 %
NONHDLC SERPL-MCNC: 86 MG/DL (CALC)
PLATELET # BLD AUTO: 270 THOUSAND/UL (ref 140–400)
PMV BLD REES-ECKER: 10 FL (ref 7.5–12.5)
POTASSIUM SERPL-SCNC: 4.6 MMOL/L (ref 3.5–5.3)
PROT SERPL-MCNC: 7.2 G/DL (ref 6.1–8.1)
PSA SERPL-MCNC: 1.91 NG/ML
RBC # BLD AUTO: 3.95 MILLION/UL (ref 4.2–5.8)
SODIUM SERPL-SCNC: 138 MMOL/L (ref 135–146)
T4 FREE SERPL-MCNC: 1.3 NG/DL (ref 0.8–1.8)
TRIGL SERPL-MCNC: 96 MG/DL
TSH SERPL-ACNC: 11.71 MIU/L (ref 0.4–4.5)
WBC # BLD AUTO: 7.9 THOUSAND/UL (ref 3.8–10.8)

## 2025-06-15 LAB
1OH-MIDAZOLAM UR-MCNC: NEGATIVE NG/ML
7AMINOCLONAZEPAM UR-MCNC: NEGATIVE NG/ML
A-OH ALPRAZ UR-MCNC: NEGATIVE NG/ML
A-OH-TRIAZOLAM UR-MCNC: NEGATIVE NG/ML
AMPHETAMINES UR QL: NEGATIVE NG/ML
BARBITURATES UR QL: NEGATIVE NG/ML
BZE UR QL: NEGATIVE NG/ML
CODEINE UR-MCNC: NEGATIVE NG/ML
CREAT UR-MCNC: 102.9 MG/DL
DRUG SCREEN COMMENT UR-IMP: ABNORMAL
EDDP UR-MCNC: NEGATIVE NG/ML
FENTANYL UR-MCNC: NEGATIVE NG/ML
HYDROCODONE UR-MCNC: NEGATIVE NG/ML
HYDROMORPHONE UR-MCNC: NEGATIVE NG/ML
LORAZEPAM UR-MCNC: NEGATIVE NG/ML
METHADONE UR-MCNC: NEGATIVE NG/ML
MORPHINE UR-MCNC: NEGATIVE NG/ML
NORDIAZEPAM UR-MCNC: NEGATIVE NG/ML
NORFENTANYL UR-MCNC: NEGATIVE NG/ML
NORHYDROCODONE UR CFM-MCNC: NEGATIVE NG/ML
NOROXYCODONE UR CFM-MCNC: NEGATIVE NG/ML
NORTRAMADOL UR-MCNC: NEGATIVE NG/ML
OH-ETHYLFLURAZ UR-MCNC: NEGATIVE NG/ML
OXAZEPAM UR-MCNC: NEGATIVE NG/ML
OXIDANTS UR QL: NEGATIVE MCG/ML
OXYCODONE UR CFM-MCNC: NEGATIVE NG/ML
OXYMORPHONE UR CFM-MCNC: NEGATIVE NG/ML
PCP UR QL: NEGATIVE NG/ML
PH UR: 5.3 [PH] (ref 4.5–9)
QUEST 6 ACETYLMORPHINE: NEGATIVE NG/ML
QUEST NOTES AND COMMENTS: ABNORMAL
QUEST ZOLPIDEM: NEGATIVE NG/ML
TEMAZEPAM UR-MCNC: NEGATIVE NG/ML
THC UR QL: POSITIVE NG/ML
THC UR-MCNC: 83 NG/ML
TRAMADOL UR-MCNC: NEGATIVE NG/ML
ZOLPIDEM PHENYL-4-CARB UR CFM-MCNC: NEGATIVE NG/ML

## 2025-06-16 ENCOUNTER — OFFICE VISIT (OUTPATIENT)
Dept: PRIMARY CARE | Facility: CLINIC | Age: 65
End: 2025-06-16
Payer: COMMERCIAL

## 2025-06-16 VITALS
BODY MASS INDEX: 28.06 KG/M2 | HEIGHT: 70 IN | HEART RATE: 65 BPM | SYSTOLIC BLOOD PRESSURE: 128 MMHG | OXYGEN SATURATION: 96 % | DIASTOLIC BLOOD PRESSURE: 78 MMHG | WEIGHT: 196 LBS

## 2025-06-16 DIAGNOSIS — Z71.89 COUNSELING REGARDING ADVANCED CARE PLANNING AND GOALS OF CARE: ICD-10-CM

## 2025-06-16 DIAGNOSIS — N18.31 STAGE 3A CHRONIC KIDNEY DISEASE (MULTI): ICD-10-CM

## 2025-06-16 DIAGNOSIS — E78.2 MIXED HYPERLIPIDEMIA: ICD-10-CM

## 2025-06-16 DIAGNOSIS — E03.8 SUBCLINICAL HYPOTHYROIDISM: ICD-10-CM

## 2025-06-16 DIAGNOSIS — I25.10 CORONARY ARTERY DISEASE INVOLVING NATIVE CORONARY ARTERY OF NATIVE HEART, UNSPECIFIED WHETHER ANGINA PRESENT: ICD-10-CM

## 2025-06-16 DIAGNOSIS — Z12.5 ENCOUNTER FOR PROSTATE CANCER SCREENING: ICD-10-CM

## 2025-06-16 DIAGNOSIS — G25.81 RLS (RESTLESS LEGS SYNDROME): ICD-10-CM

## 2025-06-16 DIAGNOSIS — E55.9 VITAMIN D DEFICIENCY: ICD-10-CM

## 2025-06-16 DIAGNOSIS — Z12.11 ENCOUNTER FOR COLORECTAL CANCER SCREENING: ICD-10-CM

## 2025-06-16 DIAGNOSIS — Z01.89 ENCOUNTER FOR ROUTINE LABORATORY TESTING: ICD-10-CM

## 2025-06-16 DIAGNOSIS — F41.9 ANXIETY: ICD-10-CM

## 2025-06-16 DIAGNOSIS — I10 PRIMARY HYPERTENSION: ICD-10-CM

## 2025-06-16 DIAGNOSIS — Z00.00 ANNUAL PHYSICAL EXAM: Primary | ICD-10-CM

## 2025-06-16 DIAGNOSIS — M15.0 PRIMARY OSTEOARTHRITIS INVOLVING MULTIPLE JOINTS: ICD-10-CM

## 2025-06-16 DIAGNOSIS — Z12.12 ENCOUNTER FOR COLORECTAL CANCER SCREENING: ICD-10-CM

## 2025-06-16 DIAGNOSIS — E11.69 TYPE 2 DIABETES MELLITUS WITH OTHER SPECIFIED COMPLICATION, WITHOUT LONG-TERM CURRENT USE OF INSULIN: ICD-10-CM

## 2025-06-16 DIAGNOSIS — Z23 ENCOUNTER FOR IMMUNIZATION: ICD-10-CM

## 2025-06-16 RX ORDER — ERGOCALCIFEROL 1.25 MG/1
1.25 CAPSULE ORAL WEEKLY
Qty: 12 CAPSULE | Refills: 0 | Status: SHIPPED | OUTPATIENT
Start: 2025-06-16 | End: 2025-09-08

## 2025-06-16 RX ORDER — LEVOTHYROXINE SODIUM 25 UG/1
25 TABLET ORAL DAILY
Qty: 90 TABLET | Refills: 3 | Status: SHIPPED | OUTPATIENT
Start: 2025-06-16 | End: 2026-06-16

## 2025-06-16 RX ORDER — VIT C/E/ZN/COPPR/LUTEIN/ZEAXAN 250MG-90MG
125 CAPSULE ORAL DAILY
COMMUNITY
Start: 2025-06-16

## 2025-06-16 ASSESSMENT — ACTIVITIES OF DAILY LIVING (ADL)
DRESSING: INDEPENDENT
DOING_HOUSEWORK: INDEPENDENT
MANAGING_FINANCES: INDEPENDENT
TAKING_MEDICATION: INDEPENDENT
BATHING: INDEPENDENT
GROCERY_SHOPPING: INDEPENDENT

## 2025-06-16 ASSESSMENT — ENCOUNTER SYMPTOMS
ENDOCRINE NEGATIVE: 1
GASTROINTESTINAL NEGATIVE: 1
NEUROLOGICAL NEGATIVE: 1
RESPIRATORY NEGATIVE: 1
CARDIOVASCULAR NEGATIVE: 1
EYES NEGATIVE: 1
HEMATOLOGIC/LYMPHATIC NEGATIVE: 1
PSYCHIATRIC NEGATIVE: 1
CONSTITUTIONAL NEGATIVE: 1
MUSCULOSKELETAL NEGATIVE: 1
ALLERGIC/IMMUNOLOGIC NEGATIVE: 1

## 2025-06-16 ASSESSMENT — PAIN SCALES - GENERAL: PAINLEVEL_OUTOF10: 0-NO PAIN

## 2025-06-16 NOTE — PATIENT INSTRUCTIONS
- Overall, the patient feels well and denies any acute symptoms / concerns at this time.  - Blood pressure at goal today.  - Encouraged continued dietary, exercise, and lifestyle modification.  - Significant medication and problem list reconciliation done today.   - Discussed lorazepam with the patient today. Notes that he has not taken this medication in a couple of months. Discussed with him that I think that it is reasonable to stop this medication today. He is in agreement. Will also work on destroying the Controlled Substance Agreement (CSA) if it has not already .    Discussed routine and/or preventative care with the patient as outlined below:  - Labwork:   - Patient had labwork done for this appointment. Discussed today.   - TSH abnormal. T4 normal. Given that the TSH is greater than 10, I think that it is reasonable to treat.  - Vitamin D deficiency. Will do prescription and OTC supplementation.  - Sugars look great. Do not need to make changes at this time.  - Mild anemia. Stable. Continue to watch.  - Will order labwork for the patient's next appointment. Encouraged the patient to get this labwork done one week prior to the next appointment.  - Imaging:   - Colorectal Cancer: Patient is due as of this year. Discussed different screening options.  - Encouraged the patient to continue to get their routine annual diabetic retinal exam.  - Immunizations:   - Discussed the RSV immunization.     ADVANCED CARE PLANNING  Advanced Care Planning was discussed with patient.  Encouraged the patient to confirm that Living Will and Healthcare Power of  (HCPoA) are accurate and up to date.  Encouraged the patient to confirm that our office be provided a copy of any documentation in the event that anything changes.

## 2025-06-16 NOTE — PROGRESS NOTES
[facility]: [facility]  MEDICARE WELLNESS EXAM      Fredi Neal is a 65 y.o. male that is presenting today for Annual Exam.    Assessment/Plan    - Overall, the patient feels well and denies any acute symptoms / concerns at this time.  - Blood pressure at goal today.  - Encouraged continued dietary, exercise, and lifestyle modification.  - Significant medication and problem list reconciliation done today.   - Discussed lorazepam with the patient today. Notes that he has not taken this medication in a couple of months. Discussed with him that I think that it is reasonable to stop this medication today. He is in agreement. Will also work on destroying the Controlled Substance Agreement (CSA) if it has not already .    Discussed routine and/or preventative care with the patient as outlined below:  - Labwork:   - Patient had labwork done for this appointment. Discussed today.   - TSH abnormal. T4 normal. Given that the TSH is greater than 10, I think that it is reasonable to treat.  - Vitamin D deficiency. Will do prescription and OTC supplementation.  - Sugars look great. Do not need to make changes at this time.  - Mild anemia. Stable. Continue to watch.  - Will order labwork for the patient's next appointment. Encouraged the patient to get this labwork done one week prior to the next appointment.  - Imaging:   - Colorectal Cancer: Patient is due as of this year. Discussed different screening options.  - Encouraged the patient to continue to get their routine annual diabetic retinal exam.  - Immunizations:   - Discussed the RSV immunization.     ADVANCED CARE PLANNING  Advanced Care Planning was discussed with patient.  Encouraged the patient to confirm that Living Will and Healthcare Power of  (HCPoA) are accurate and up to date.  Encouraged the patient to confirm that our office be provided a copy of any documentation in the event that anything changes.    Diagnoses and  all orders for this visit:  Annual physical exam  -     Follow Up In Primary Care  Counseling regarding advanced care planning and goals of care  Encounter for colorectal cancer screening  Encounter for routine laboratory testing  Encounter for prostate cancer screening  Encounter for immunization  Coronary artery disease involving native coronary artery of native heart, unspecified whether angina present  -     Follow Up In Primary Care; Future  Type 2 diabetes mellitus with other specified complication, without long-term current use of insulin  -     Follow Up In Primary Care; Future  -     CBC and Auto Differential; Future  -     Comprehensive Metabolic Panel; Future  -     TSH with reflex to Free T4 if abnormal; Future  -     Vitamin D 25-Hydroxy,Total (for eval of Vitamin D levels); Future  -     Hemoglobin A1C; Future  Primary osteoarthritis involving multiple joints  -     Follow Up In Primary Care; Future  -     CBC and Auto Differential; Future  -     Comprehensive Metabolic Panel; Future  -     TSH with reflex to Free T4 if abnormal; Future  -     Vitamin D 25-Hydroxy,Total (for eval of Vitamin D levels); Future  -     Hemoglobin A1C; Future  Stage 3a chronic kidney disease (Multi)  -     Follow Up In Primary Care; Future  -     CBC and Auto Differential; Future  -     Comprehensive Metabolic Panel; Future  -     TSH with reflex to Free T4 if abnormal; Future  -     Vitamin D 25-Hydroxy,Total (for eval of Vitamin D levels); Future  -     Hemoglobin A1C; Future  RLS (restless legs syndrome)  -     Follow Up In Primary Care; Future  -     CBC and Auto Differential; Future  -     Comprehensive Metabolic Panel; Future  -     TSH with reflex to Free T4 if abnormal; Future  -     Vitamin D 25-Hydroxy,Total (for eval of Vitamin D levels); Future  -     Hemoglobin A1C; Future  Subclinical hypothyroidism  -     Follow Up In Primary Care; Future  -     levothyroxine (Synthroid, Levoxyl) 25 mcg tablet; Take 1 tablet  (25 mcg) by mouth early in the morning.. Take on an empty stomach at the same time each day, either 30 to 60 minutes prior to breakfast  -     CBC and Auto Differential; Future  -     Comprehensive Metabolic Panel; Future  -     TSH with reflex to Free T4 if abnormal; Future  -     Vitamin D 25-Hydroxy,Total (for eval of Vitamin D levels); Future  -     Hemoglobin A1C; Future  Mixed hyperlipidemia  -     Follow Up In Primary Care; Future  -     CBC and Auto Differential; Future  -     Comprehensive Metabolic Panel; Future  -     TSH with reflex to Free T4 if abnormal; Future  -     Vitamin D 25-Hydroxy,Total (for eval of Vitamin D levels); Future  -     Hemoglobin A1C; Future  Primary hypertension  -     Follow Up In Primary Care; Future  -     CBC and Auto Differential; Future  -     Comprehensive Metabolic Panel; Future  -     TSH with reflex to Free T4 if abnormal; Future  -     Vitamin D 25-Hydroxy,Total (for eval of Vitamin D levels); Future  -     Hemoglobin A1C; Future  Vitamin D deficiency  -     Follow Up In Primary Care; Future  -     ergocalciferol (Vitamin D-2) 1250 mcg (50,000 units) capsule; Take 1 capsule (1.25 mg) by mouth 1 (one) time per week.  -     cholecalciferol (Vitamin D-3) 125 mcg (5,000 units) capsule; Take 1 capsule (125 mcg) by mouth once daily.  -     CBC and Auto Differential; Future  -     Comprehensive Metabolic Panel; Future  -     TSH with reflex to Free T4 if abnormal; Future  -     Vitamin D 25-Hydroxy,Total (for eval of Vitamin D levels); Future  -     Hemoglobin A1C; Future  Anxiety  -     Follow Up In Primary Care; Future  -     CBC and Auto Differential; Future  -     Comprehensive Metabolic Panel; Future  -     TSH with reflex to Free T4 if abnormal; Future  -     Vitamin D 25-Hydroxy,Total (for eval of Vitamin D levels); Future  -     Hemoglobin A1C; Future    Current Outpatient Medications   Medication Instructions    carvedilol (COREG) 6.25 mg, oral, 2 times daily after  meals    cholecalciferol (VITAMIN D-3) 125 mcg, oral, Daily    clopidogrel (PLAVIX) 75 mg, oral, Daily    ergocalciferol (VITAMIN D-2) 1.25 mg, oral, Weekly    fenofibrate (TRICOR) 145 mg, oral, Daily, Take with food.    levothyroxine (SYNTHROID, LEVOXYL) 25 mcg, oral, Daily, Take on an empty stomach at the same time each day, either 30 to 60 minutes prior to breakfast    lisinopril 20 mg, oral, Daily    metFORMIN XR (GLUCOPHAGE-XR) 500 mg, oral, Daily with evening meal, Do not crush, chew, or split.    nitroglycerin (NITROSTAT) 0.4 mg, sublingual, Every 5 min PRN    rosuvastatin (CRESTOR) 40 mg, oral, Daily    sertraline (ZOLOFT) 100 mg, oral, Daily     Subjective   - The patient otherwise feels well and denies any acute symptoms or concerns at this time.  - The patient denies any changes or progression of their chronic medical problems.  - The patient denies any problems or concerns with their medications.      Review of Systems   Constitutional: Negative.    HENT: Negative.     Eyes: Negative.    Respiratory: Negative.     Cardiovascular: Negative.    Gastrointestinal: Negative.    Endocrine: Negative.    Genitourinary: Negative.    Musculoskeletal: Negative.    Skin: Negative.    Allergic/Immunologic: Negative.    Neurological: Negative.    Hematological: Negative.    Psychiatric/Behavioral: Negative.     All other systems reviewed and are negative.    Objective   Vitals:    06/16/25 1559   BP: 128/78   Pulse: 65   SpO2: 96%      Body mass index is 28.12 kg/m².  Physical Exam  Vitals and nursing note reviewed.   Constitutional:       General: He is not in acute distress.     Appearance: Normal appearance. He is not ill-appearing.   HENT:      Head: Normocephalic and atraumatic.      Right Ear: Tympanic membrane, ear canal and external ear normal. There is no impacted cerumen.      Left Ear: Tympanic membrane, ear canal and external ear normal. There is no impacted cerumen.      Nose: Nose normal.       Mouth/Throat:      Mouth: Mucous membranes are moist.      Pharynx: Oropharynx is clear. No oropharyngeal exudate or posterior oropharyngeal erythema.   Eyes:      General: No scleral icterus.        Right eye: No discharge.         Left eye: No discharge.      Extraocular Movements: Extraocular movements intact.      Conjunctiva/sclera: Conjunctivae normal.      Pupils: Pupils are equal, round, and reactive to light.   Neck:      Vascular: No carotid bruit.   Cardiovascular:      Rate and Rhythm: Normal rate and regular rhythm.      Pulses: Normal pulses.      Heart sounds: Normal heart sounds. No murmur heard.     No friction rub. No gallop.   Pulmonary:      Effort: Pulmonary effort is normal. No respiratory distress.      Breath sounds: Normal breath sounds.   Abdominal:      General: Abdomen is flat. Bowel sounds are normal.      Palpations: Abdomen is soft.      Tenderness: There is no abdominal tenderness.      Hernia: No hernia is present.   Musculoskeletal:         General: No swelling. Normal range of motion.      Cervical back: Normal range of motion.   Lymphadenopathy:      Cervical: No cervical adenopathy.   Skin:     General: Skin is warm and dry.      Coloration: Skin is not jaundiced.      Findings: No rash.   Neurological:      General: No focal deficit present.      Mental Status: He is alert and oriented to person, place, and time. Mental status is at baseline.   Psychiatric:         Mood and Affect: Mood normal.         Behavior: Behavior normal.       Diagnostic Results   Lab Results   Component Value Date    GLUCOSE 116 (H) 06/11/2025    CALCIUM 9.1 06/11/2025     06/11/2025    K 4.6 06/11/2025    CO2 22 06/11/2025     06/11/2025    BUN 24 06/11/2025    CREATININE 1.38 (H) 06/11/2025     Lab Results   Component Value Date    ALT 10 06/11/2025    AST 16 06/11/2025    ALKPHOS 22 (L) 06/11/2025    BILITOT 0.3 06/11/2025     Lab Results   Component Value Date    WBC 7.9 06/11/2025     "HGB 11.9 (L) 06/11/2025    HCT 37.1 (L) 06/11/2025    MCV 93.9 06/11/2025     06/11/2025     Lab Results   Component Value Date    CHOL 141 06/11/2025    CHOL 136 07/26/2024    CHOL 153 12/16/2023     Lab Results   Component Value Date    HDL 55 06/11/2025    HDL 35.0 (L) 07/26/2024    HDL 38.0 (L) 12/16/2023     Lab Results   Component Value Date    LDLCALC 68 06/11/2025    LDLCALC 68 07/26/2024    LDLCALC 82 12/16/2023     Lab Results   Component Value Date    TRIG 96 06/11/2025    TRIG 164 (H) 07/26/2024    TRIG 165 (H) 12/16/2023     No components found for: \"CHOLHDL\"  Lab Results   Component Value Date    HGBA1C 6.2 (H) 06/11/2025     Other labs not included in the list above reviewed either before or during this encounter.    History   Medical History[1]  Surgical History[2]  Family History[3]  Social History     Socioeconomic History    Marital status:      Spouse name: Not on file    Number of children: Not on file    Years of education: Not on file    Highest education level: Not on file   Occupational History    Not on file   Tobacco Use    Smoking status: Never    Smokeless tobacco: Never   Vaping Use    Vaping status: Never Used   Substance and Sexual Activity    Alcohol use: Yes     Comment: socially    Drug use: Yes     Types: Marijuana    Sexual activity: Defer   Other Topics Concern    Not on file   Social History Narrative    Not on file     Social Drivers of Health     Financial Resource Strain: Not on file   Food Insecurity: Not on file   Transportation Needs: Not on file   Physical Activity: Not on file   Stress: Not on file   Social Connections: Not on file   Intimate Partner Violence: Not on file   Housing Stability: Not on file     Allergies[4]  Medications Ordered Prior to Encounter[5]  Immunization History   Administered Date(s) Administered    Flu vaccine (IIV4), preservative free *Check age/dose* 10/25/2023    Flu vaccine, quadrivalent, recombinant, preservative free, adult " (FLUBLOK) 12/02/2021    Influenza, injectable, quadrivalent 10/01/2020    Pfizer Purple Cap SARS-CoV-2 04/10/2021, 05/01/2021, 12/24/2021    Pneumococcal conjugate vaccine, 20-valent (PREVNAR 20) 01/12/2024    Tdap vaccine, age 7 year and older (BOOSTRIX, ADACEL) 08/23/2017    Zoster vaccine, recombinant, adult (SHINGRIX) 01/04/2023, 05/20/2023     Patient's medical history was reviewed and updated either before or during this encounter.     Cali Chakraborty MD         [1]   Past Medical History:  Diagnosis Date    Anxiety     ASHD (arteriosclerotic heart disease)     CAD (coronary artery disease)     DJD (degenerative joint disease)     Hyperglycemia     Hyperlipidemia     Hypertension    [2]   Past Surgical History:  Procedure Laterality Date    CARDIAC CATHETERIZATION      04/2014   [3]   Family History  Problem Relation Name Age of Onset    Cancer Mother      Heart attack Father      Heart disease Father      Diabetes Sister      Sleep apnea Brother      No Known Problems Brother      No Known Problems Daughter      No Known Problems Son     [4] No Known Allergies  [5]   Current Outpatient Medications on File Prior to Visit   Medication Sig Dispense Refill    carvedilol (Coreg) 6.25 mg tablet Take 1 tablet (6.25 mg) by mouth 2 times a day after meals. 180 tablet 3    clopidogrel (Plavix) 75 mg tablet Take 1 tablet (75 mg) by mouth once daily. 90 tablet 3    fenofibrate (Tricor) 145 mg tablet Take 1 tablet (145 mg) by mouth once daily. Take with food. 90 tablet 3    lisinopril 20 mg tablet Take 1 tablet (20 mg) by mouth once daily. 90 tablet 3    metFORMIN XR (Glucophage-XR) 500 mg 24 hr tablet Take 1 tablet (500 mg) by mouth once daily in the evening. Take with meals. Do not crush, chew, or split. 90 tablet 3    nitroglycerin (Nitrostat) 0.4 mg SL tablet Place 1 tablet (0.4 mg) under the tongue every 5 minutes if needed for chest pain. 90 tablet 0    rosuvastatin (Crestor) 40 mg tablet Take 1 tablet (40 mg) by  mouth once daily. 90 tablet 3    sertraline (Zoloft) 100 mg tablet Take 1 tablet (100 mg) by mouth once daily. 90 tablet 3    [DISCONTINUED] LORazepam (Ativan) 1 mg tablet TAKE ONE TABLET BY MOUTH EVERY DAY AS NEEDED for anxiety (Patient not taking: Reported on 6/16/2025) 30 tablet 0     No current facility-administered medications on file prior to visit.

## 2025-06-17 ENCOUNTER — APPOINTMENT (OUTPATIENT)
Dept: PRIMARY CARE | Facility: CLINIC | Age: 65
End: 2025-06-17
Payer: COMMERCIAL

## 2025-07-01 DIAGNOSIS — I10 PRIMARY HYPERTENSION: ICD-10-CM

## 2025-07-01 DIAGNOSIS — E78.2 MIXED HYPERLIPIDEMIA: ICD-10-CM

## 2025-08-13 ENCOUNTER — APPOINTMENT (OUTPATIENT)
Facility: CLINIC | Age: 65
End: 2025-08-13
Payer: COMMERCIAL

## 2025-08-13 DIAGNOSIS — E78.2 MIXED HYPERLIPIDEMIA: ICD-10-CM

## 2025-08-13 DIAGNOSIS — I25.10 CORONARY ARTERY DISEASE INVOLVING NATIVE CORONARY ARTERY OF NATIVE HEART, UNSPECIFIED WHETHER ANGINA PRESENT: Primary | ICD-10-CM

## 2025-08-13 DIAGNOSIS — I10 PRIMARY HYPERTENSION: ICD-10-CM

## 2025-11-04 ENCOUNTER — APPOINTMENT (OUTPATIENT)
Facility: CLINIC | Age: 65
End: 2025-11-04
Payer: COMMERCIAL

## 2025-12-22 ENCOUNTER — APPOINTMENT (OUTPATIENT)
Dept: PRIMARY CARE | Facility: CLINIC | Age: 65
End: 2025-12-22
Payer: COMMERCIAL